# Patient Record
Sex: FEMALE | Race: OTHER | Employment: UNEMPLOYED | URBAN - METROPOLITAN AREA
[De-identification: names, ages, dates, MRNs, and addresses within clinical notes are randomized per-mention and may not be internally consistent; named-entity substitution may affect disease eponyms.]

---

## 2019-03-20 ENCOUNTER — HOSPITAL ENCOUNTER (EMERGENCY)
Age: 44
Discharge: HOME | End: 2019-03-20
Attending: EMERGENCY MEDICINE
Payer: COMMERCIAL

## 2019-03-20 ENCOUNTER — APPOINTMENT (OUTPATIENT)
Dept: GENERAL RADIOLOGY | Age: 44
End: 2019-03-20
Payer: COMMERCIAL

## 2019-03-20 VITALS
HEIGHT: 64 IN | SYSTOLIC BLOOD PRESSURE: 144 MMHG | RESPIRATION RATE: 14 BRPM | TEMPERATURE: 98.6 F | DIASTOLIC BLOOD PRESSURE: 93 MMHG | WEIGHT: 200 LBS | BODY MASS INDEX: 34.15 KG/M2 | OXYGEN SATURATION: 99 % | HEART RATE: 67 BPM

## 2019-03-20 DIAGNOSIS — I10 HYPERTENSION, UNSPECIFIED TYPE: ICD-10-CM

## 2019-03-20 DIAGNOSIS — J06.9 VIRAL UPPER RESPIRATORY TRACT INFECTION: Primary | ICD-10-CM

## 2019-03-20 LAB
ABSOLUTE EOS #: 0.2 K/UL (ref 0–0.4)
ABSOLUTE IMMATURE GRANULOCYTE: NORMAL K/UL (ref 0–0.3)
ABSOLUTE LYMPH #: 2.1 K/UL (ref 1–4.8)
ABSOLUTE MONO #: 0.5 K/UL (ref 0.1–1.3)
ANION GAP SERPL CALCULATED.3IONS-SCNC: 13 MMOL/L (ref 9–17)
BASOPHILS # BLD: 1 % (ref 0–2)
BASOPHILS ABSOLUTE: 0 K/UL (ref 0–0.2)
BUN BLDV-MCNC: 19 MG/DL (ref 6–20)
BUN/CREAT BLD: NORMAL (ref 9–20)
CALCIUM SERPL-MCNC: 9 MG/DL (ref 8.6–10.4)
CHLORIDE BLD-SCNC: 105 MMOL/L (ref 98–107)
CO2: 22 MMOL/L (ref 20–31)
CREAT SERPL-MCNC: 0.6 MG/DL (ref 0.5–0.9)
DIFFERENTIAL TYPE: NORMAL
EOSINOPHILS RELATIVE PERCENT: 3 % (ref 0–4)
GFR AFRICAN AMERICAN: >60 ML/MIN
GFR NON-AFRICAN AMERICAN: >60 ML/MIN
GFR SERPL CREATININE-BSD FRML MDRD: NORMAL ML/MIN/{1.73_M2}
GFR SERPL CREATININE-BSD FRML MDRD: NORMAL ML/MIN/{1.73_M2}
GLUCOSE BLD-MCNC: 91 MG/DL (ref 70–99)
HCT VFR BLD CALC: 41.4 % (ref 36–46)
HEMOGLOBIN: 14 G/DL (ref 12–16)
IMMATURE GRANULOCYTES: NORMAL %
LYMPHOCYTES # BLD: 30 % (ref 24–44)
MCH RBC QN AUTO: 29 PG (ref 26–34)
MCHC RBC AUTO-ENTMCNC: 33.8 G/DL (ref 31–37)
MCV RBC AUTO: 85.9 FL (ref 80–100)
MONOCYTES # BLD: 7 % (ref 1–7)
NRBC AUTOMATED: NORMAL PER 100 WBC
PDW BLD-RTO: 13.4 % (ref 11.5–14.9)
PLATELET # BLD: 317 K/UL (ref 150–450)
PLATELET ESTIMATE: NORMAL
PMV BLD AUTO: 7.7 FL (ref 6–12)
POTASSIUM SERPL-SCNC: 3.9 MMOL/L (ref 3.7–5.3)
RBC # BLD: 4.82 M/UL (ref 4–5.2)
RBC # BLD: NORMAL 10*6/UL
SEG NEUTROPHILS: 59 % (ref 36–66)
SEGMENTED NEUTROPHILS ABSOLUTE COUNT: 4 K/UL (ref 1.3–9.1)
SODIUM BLD-SCNC: 140 MMOL/L (ref 135–144)
TROPONIN INTERP: NORMAL
TROPONIN T: NORMAL NG/ML
TROPONIN, HIGH SENSITIVITY: <6 NG/L (ref 0–14)
WBC # BLD: 6.9 K/UL (ref 3.5–11)
WBC # BLD: NORMAL 10*3/UL

## 2019-03-20 PROCEDURE — 93005 ELECTROCARDIOGRAM TRACING: CPT

## 2019-03-20 PROCEDURE — 36415 COLL VENOUS BLD VENIPUNCTURE: CPT

## 2019-03-20 PROCEDURE — 84484 ASSAY OF TROPONIN QUANT: CPT

## 2019-03-20 PROCEDURE — 85025 COMPLETE CBC W/AUTO DIFF WBC: CPT

## 2019-03-20 PROCEDURE — 71046 X-RAY EXAM CHEST 2 VIEWS: CPT

## 2019-03-20 PROCEDURE — 80048 BASIC METABOLIC PNL TOTAL CA: CPT

## 2019-03-20 PROCEDURE — 6370000000 HC RX 637 (ALT 250 FOR IP): Performed by: STUDENT IN AN ORGANIZED HEALTH CARE EDUCATION/TRAINING PROGRAM

## 2019-03-20 PROCEDURE — 99285 EMERGENCY DEPT VISIT HI MDM: CPT

## 2019-03-20 RX ORDER — ALBUTEROL SULFATE 90 UG/1
1-2 AEROSOL, METERED RESPIRATORY (INHALATION) EVERY 4 HOURS PRN
Qty: 1 INHALER | Refills: 0 | Status: SHIPPED | OUTPATIENT
Start: 2019-03-20 | End: 2019-07-02

## 2019-03-20 RX ORDER — LOSARTAN POTASSIUM AND HYDROCHLOROTHIAZIDE 12.5; 1 MG/1; MG/1
1 TABLET ORAL DAILY
Qty: 30 TABLET | Refills: 0 | Status: SHIPPED | OUTPATIENT
Start: 2019-03-20

## 2019-03-20 RX ORDER — HYDROCHLOROTHIAZIDE 25 MG/1
25 TABLET ORAL DAILY
Status: DISCONTINUED | OUTPATIENT
Start: 2019-03-20 | End: 2019-03-20 | Stop reason: HOSPADM

## 2019-03-20 RX ADMIN — HYDROCHLOROTHIAZIDE 25 MG: 25 TABLET ORAL at 16:17

## 2019-03-20 ASSESSMENT — HEART SCORE: ECG: 0

## 2019-03-28 ENCOUNTER — OFFICE VISIT (OUTPATIENT)
Dept: INTERNAL MEDICINE CLINIC | Age: 44
End: 2019-03-28
Payer: COMMERCIAL

## 2019-03-28 VITALS
BODY MASS INDEX: 35.17 KG/M2 | SYSTOLIC BLOOD PRESSURE: 132 MMHG | HEART RATE: 97 BPM | WEIGHT: 206 LBS | DIASTOLIC BLOOD PRESSURE: 82 MMHG | HEIGHT: 64 IN | OXYGEN SATURATION: 98 %

## 2019-03-28 DIAGNOSIS — R07.9 CHEST PAIN, UNSPECIFIED TYPE: ICD-10-CM

## 2019-03-28 DIAGNOSIS — M19.041 OSTEOARTHRITIS OF BOTH HANDS, UNSPECIFIED OSTEOARTHRITIS TYPE: ICD-10-CM

## 2019-03-28 DIAGNOSIS — I10 ESSENTIAL HYPERTENSION: ICD-10-CM

## 2019-03-28 DIAGNOSIS — Z12.39 BREAST CANCER SCREENING: ICD-10-CM

## 2019-03-28 DIAGNOSIS — I10 HYPERTENSION, UNSPECIFIED TYPE: Primary | ICD-10-CM

## 2019-03-28 DIAGNOSIS — M19.042 OSTEOARTHRITIS OF BOTH HANDS, UNSPECIFIED OSTEOARTHRITIS TYPE: ICD-10-CM

## 2019-03-28 DIAGNOSIS — G43.809 OTHER MIGRAINE WITHOUT STATUS MIGRAINOSUS, NOT INTRACTABLE: ICD-10-CM

## 2019-03-28 PROCEDURE — G8484 FLU IMMUNIZE NO ADMIN: HCPCS | Performed by: INTERNAL MEDICINE

## 2019-03-28 PROCEDURE — G8427 DOCREV CUR MEDS BY ELIG CLIN: HCPCS | Performed by: INTERNAL MEDICINE

## 2019-03-28 PROCEDURE — G8417 CALC BMI ABV UP PARAM F/U: HCPCS | Performed by: INTERNAL MEDICINE

## 2019-03-28 PROCEDURE — 99204 OFFICE O/P NEW MOD 45 MIN: CPT | Performed by: INTERNAL MEDICINE

## 2019-03-28 PROCEDURE — 1036F TOBACCO NON-USER: CPT | Performed by: INTERNAL MEDICINE

## 2019-03-28 RX ORDER — SUMATRIPTAN 50 MG/1
50 TABLET, FILM COATED ORAL
Qty: 9 TABLET | Refills: 3 | Status: SHIPPED | OUTPATIENT
Start: 2019-03-28 | End: 2019-03-28

## 2019-03-28 RX ORDER — AMITRIPTYLINE HYDROCHLORIDE 25 MG/1
25 TABLET, FILM COATED ORAL NIGHTLY
Qty: 30 TABLET | Refills: 0 | Status: SHIPPED | OUTPATIENT
Start: 2019-03-28 | End: 2019-04-23 | Stop reason: SDUPTHER

## 2019-03-28 RX ORDER — PANTOPRAZOLE SODIUM 40 MG/1
40 TABLET, DELAYED RELEASE ORAL
Qty: 90 TABLET | Refills: 1 | Status: SHIPPED | OUTPATIENT
Start: 2019-03-28

## 2019-03-28 ASSESSMENT — ENCOUNTER SYMPTOMS
CHEST TIGHTNESS: 1
COLOR CHANGE: 0
BLOOD IN STOOL: 0
CHOKING: 0
CONSTIPATION: 0
EYE REDNESS: 0
EYE PAIN: 0
BACK PAIN: 0
DIARRHEA: 0
EYE DISCHARGE: 0
ABDOMINAL DISTENTION: 0
ABDOMINAL PAIN: 0
EYE ITCHING: 0
APNEA: 0
COUGH: 0
SHORTNESS OF BREATH: 1

## 2019-03-28 ASSESSMENT — PATIENT HEALTH QUESTIONNAIRE - PHQ9
SUM OF ALL RESPONSES TO PHQ QUESTIONS 1-9: 0
SUM OF ALL RESPONSES TO PHQ QUESTIONS 1-9: 0
1. LITTLE INTEREST OR PLEASURE IN DOING THINGS: 0
2. FEELING DOWN, DEPRESSED OR HOPELESS: 0
SUM OF ALL RESPONSES TO PHQ9 QUESTIONS 1 & 2: 0

## 2019-04-23 RX ORDER — AMITRIPTYLINE HYDROCHLORIDE 25 MG/1
TABLET, FILM COATED ORAL
Qty: 30 TABLET | Refills: 0 | Status: SHIPPED | OUTPATIENT
Start: 2019-04-23

## 2019-05-15 LAB
EKG ATRIAL RATE: 80 BPM
EKG P AXIS: 59 DEGREES
EKG P-R INTERVAL: 154 MS
EKG Q-T INTERVAL: 390 MS
EKG QRS DURATION: 80 MS
EKG QTC CALCULATION (BAZETT): 449 MS
EKG R AXIS: 64 DEGREES
EKG T AXIS: 17 DEGREES
EKG VENTRICULAR RATE: 80 BPM

## 2019-07-02 ENCOUNTER — HOSPITAL ENCOUNTER (EMERGENCY)
Age: 44
Discharge: HOME | End: 2019-07-02
Attending: EMERGENCY MEDICINE
Payer: COMMERCIAL

## 2019-07-02 ENCOUNTER — APPOINTMENT (OUTPATIENT)
Dept: GENERAL RADIOLOGY | Age: 44
End: 2019-07-02
Payer: COMMERCIAL

## 2019-07-02 VITALS
TEMPERATURE: 98.1 F | RESPIRATION RATE: 16 BRPM | WEIGHT: 218 LBS | DIASTOLIC BLOOD PRESSURE: 93 MMHG | HEIGHT: 64 IN | BODY MASS INDEX: 37.22 KG/M2 | SYSTOLIC BLOOD PRESSURE: 153 MMHG | OXYGEN SATURATION: 99 % | HEART RATE: 82 BPM

## 2019-07-02 DIAGNOSIS — R07.9 CHEST PAIN, UNSPECIFIED TYPE: Primary | ICD-10-CM

## 2019-07-02 LAB
ABSOLUTE EOS #: 0.2 K/UL (ref 0–0.4)
ABSOLUTE IMMATURE GRANULOCYTE: NORMAL K/UL (ref 0–0.3)
ABSOLUTE LYMPH #: 2.5 K/UL (ref 1–4.8)
ABSOLUTE MONO #: 0.5 K/UL (ref 0.1–1.3)
ALBUMIN SERPL-MCNC: 4.1 G/DL (ref 3.5–5.2)
ALBUMIN/GLOBULIN RATIO: NORMAL (ref 1–2.5)
ALP BLD-CCNC: 68 U/L (ref 35–104)
ALT SERPL-CCNC: 16 U/L (ref 5–33)
ANION GAP SERPL CALCULATED.3IONS-SCNC: 14 MMOL/L (ref 9–17)
AST SERPL-CCNC: 15 U/L
BASOPHILS # BLD: 1 % (ref 0–2)
BASOPHILS ABSOLUTE: 0.1 K/UL (ref 0–0.2)
BILIRUB SERPL-MCNC: 0.34 MG/DL (ref 0.3–1.2)
BUN BLDV-MCNC: 12 MG/DL (ref 6–20)
BUN/CREAT BLD: NORMAL (ref 9–20)
CALCIUM SERPL-MCNC: 8.8 MG/DL (ref 8.6–10.4)
CHLORIDE BLD-SCNC: 104 MMOL/L (ref 98–107)
CO2: 21 MMOL/L (ref 20–31)
CREAT SERPL-MCNC: 0.6 MG/DL (ref 0.5–0.9)
DIFFERENTIAL TYPE: NORMAL
EOSINOPHILS RELATIVE PERCENT: 2 % (ref 0–4)
GFR AFRICAN AMERICAN: >60 ML/MIN
GFR NON-AFRICAN AMERICAN: >60 ML/MIN
GFR SERPL CREATININE-BSD FRML MDRD: NORMAL ML/MIN/{1.73_M2}
GFR SERPL CREATININE-BSD FRML MDRD: NORMAL ML/MIN/{1.73_M2}
GLUCOSE BLD-MCNC: 85 MG/DL (ref 70–99)
HCG QUALITATIVE: NEGATIVE
HCT VFR BLD CALC: 39.5 % (ref 36–46)
HEMOGLOBIN: 13.2 G/DL (ref 12–16)
IMMATURE GRANULOCYTES: NORMAL %
LYMPHOCYTES # BLD: 31 % (ref 24–44)
MCH RBC QN AUTO: 28.5 PG (ref 26–34)
MCHC RBC AUTO-ENTMCNC: 33.5 G/DL (ref 31–37)
MCV RBC AUTO: 85 FL (ref 80–100)
MONOCYTES # BLD: 7 % (ref 1–7)
NRBC AUTOMATED: NORMAL PER 100 WBC
PDW BLD-RTO: 13.9 % (ref 11.5–14.9)
PLATELET # BLD: 330 K/UL (ref 150–450)
PLATELET ESTIMATE: NORMAL
PMV BLD AUTO: 7.3 FL (ref 6–12)
POTASSIUM SERPL-SCNC: 3.9 MMOL/L (ref 3.7–5.3)
RBC # BLD: 4.65 M/UL (ref 4–5.2)
RBC # BLD: NORMAL 10*6/UL
SEG NEUTROPHILS: 59 % (ref 36–66)
SEGMENTED NEUTROPHILS ABSOLUTE COUNT: 4.8 K/UL (ref 1.3–9.1)
SODIUM BLD-SCNC: 139 MMOL/L (ref 135–144)
TOTAL PROTEIN: 7.4 G/DL (ref 6.4–8.3)
TROPONIN INTERP: NORMAL
TROPONIN INTERP: NORMAL
TROPONIN T: NORMAL NG/ML
TROPONIN T: NORMAL NG/ML
TROPONIN, HIGH SENSITIVITY: <6 NG/L (ref 0–14)
TROPONIN, HIGH SENSITIVITY: <6 NG/L (ref 0–14)
WBC # BLD: 8.1 K/UL (ref 3.5–11)
WBC # BLD: NORMAL 10*3/UL

## 2019-07-02 PROCEDURE — 6370000000 HC RX 637 (ALT 250 FOR IP): Performed by: EMERGENCY MEDICINE

## 2019-07-02 PROCEDURE — 84703 CHORIONIC GONADOTROPIN ASSAY: CPT

## 2019-07-02 PROCEDURE — 94761 N-INVAS EAR/PLS OXIMETRY MLT: CPT

## 2019-07-02 PROCEDURE — 94640 AIRWAY INHALATION TREATMENT: CPT

## 2019-07-02 PROCEDURE — 99285 EMERGENCY DEPT VISIT HI MDM: CPT

## 2019-07-02 PROCEDURE — 36415 COLL VENOUS BLD VENIPUNCTURE: CPT

## 2019-07-02 PROCEDURE — 85025 COMPLETE CBC W/AUTO DIFF WBC: CPT

## 2019-07-02 PROCEDURE — 80053 COMPREHEN METABOLIC PANEL: CPT

## 2019-07-02 PROCEDURE — 6360000002 HC RX W HCPCS: Performed by: EMERGENCY MEDICINE

## 2019-07-02 PROCEDURE — 71045 X-RAY EXAM CHEST 1 VIEW: CPT

## 2019-07-02 PROCEDURE — 93005 ELECTROCARDIOGRAM TRACING: CPT | Performed by: EMERGENCY MEDICINE

## 2019-07-02 PROCEDURE — 84484 ASSAY OF TROPONIN QUANT: CPT

## 2019-07-02 RX ORDER — ALBUTEROL SULFATE 2.5 MG/3ML
2.5 SOLUTION RESPIRATORY (INHALATION) ONCE
Status: COMPLETED | OUTPATIENT
Start: 2019-07-02 | End: 2019-07-02

## 2019-07-02 RX ORDER — LIDOCAINE HYDROCHLORIDE 20 MG/ML
15 SOLUTION OROPHARYNGEAL
Status: DISCONTINUED | OUTPATIENT
Start: 2019-07-02 | End: 2019-07-02 | Stop reason: HOSPADM

## 2019-07-02 RX ORDER — ACETAMINOPHEN 325 MG/1
650 TABLET ORAL ONCE
Status: COMPLETED | OUTPATIENT
Start: 2019-07-02 | End: 2019-07-02

## 2019-07-02 RX ORDER — MAGNESIUM HYDROXIDE/ALUMINUM HYDROXICE/SIMETHICONE 120; 1200; 1200 MG/30ML; MG/30ML; MG/30ML
30 SUSPENSION ORAL ONCE
Status: COMPLETED | OUTPATIENT
Start: 2019-07-02 | End: 2019-07-02

## 2019-07-02 RX ORDER — ALBUTEROL SULFATE 90 UG/1
2 AEROSOL, METERED RESPIRATORY (INHALATION) 4 TIMES DAILY PRN
Qty: 1 INHALER | Refills: 0 | Status: SHIPPED | OUTPATIENT
Start: 2019-07-02

## 2019-07-02 RX ADMIN — ACETAMINOPHEN 650 MG: 325 TABLET, FILM COATED ORAL at 18:25

## 2019-07-02 RX ADMIN — LIDOCAINE HYDROCHLORIDE 15 ML: 20 SOLUTION ORAL; TOPICAL at 17:30

## 2019-07-02 RX ADMIN — ALBUTEROL SULFATE 2.5 MG: 2.5 SOLUTION RESPIRATORY (INHALATION) at 16:32

## 2019-07-02 RX ADMIN — ALUMINUM HYDROXIDE, MAGNESIUM HYDROXIDE, AND SIMETHICONE 30 ML: 200; 200; 20 SUSPENSION ORAL at 17:30

## 2019-07-02 ASSESSMENT — ENCOUNTER SYMPTOMS
ABDOMINAL PAIN: 0
GASTROINTESTINAL NEGATIVE: 1
RESPIRATORY NEGATIVE: 1
SHORTNESS OF BREATH: 0
EYES NEGATIVE: 1
COUGH: 0
BACK PAIN: 0

## 2019-07-02 ASSESSMENT — PAIN DESCRIPTION - FREQUENCY: FREQUENCY: INTERMITTENT

## 2019-07-02 ASSESSMENT — PAIN DESCRIPTION - DESCRIPTORS: DESCRIPTORS: PRESSURE;TIGHTNESS

## 2019-07-02 ASSESSMENT — PAIN DESCRIPTION - ONSET: ONSET: GRADUAL

## 2019-07-02 ASSESSMENT — PAIN DESCRIPTION - LOCATION: LOCATION: CHEST

## 2019-07-02 ASSESSMENT — PAIN DESCRIPTION - PAIN TYPE: TYPE: ACUTE PAIN

## 2019-07-02 ASSESSMENT — PAIN SCALES - GENERAL: PAINLEVEL_OUTOF10: 5

## 2019-07-02 NOTE — ED PROVIDER NOTES
EMERGENCY DEPARTMENT ENCOUNTER    Pt Name: Peter Stock  MRN: [id number]  [place] 1975  Date of evaluation: 7/2/19  CHIEF COMPLAINT       Chief Complaint   Patient presents with    Chest Pain    Shortness of Breath     HISTORY OF PRESENT ILLNESS   The pt presents for evaluation of chest pain and shortness of breath. It started at 0700 this am.  On/off. Described as a pressure. No fever or cough. Pt denies travel, trauma, surgery, leg pain/swelling, estrogens, history of dvt/pe or other concerning symptoms. Pt denies any cardiac history. She does have a history of htn. Family history of cad. Nonsmoker. The history is provided by the patient. Chest Pain   Pain location:  Substernal area  Pain quality: pressure    Pain radiates to:  Does not radiate  Pain severity:  Moderate  Onset quality:  Gradual  Duration:  9 hours  Timing:  Constant  Progression:  Unchanged  Chronicity:  New  Relieved by:  Nothing  Worsened by:  Nothing  Ineffective treatments:  None tried  Associated symptoms: no abdominal pain, no back pain, no cough, no fever, no headache and no shortness of breath        REVIEW OF SYSTEMS     Review of Systems   Constitutional: Negative. Negative for chills and fever. HENT: Negative. Negative for congestion. Eyes: Negative. Respiratory: Negative. Negative for cough and shortness of breath. Cardiovascular: Positive for chest pain. Gastrointestinal: Negative. Negative for abdominal pain. Genitourinary: Negative. Musculoskeletal: Negative. Negative for back pain. Skin: Negative. Negative for rash. Neurological: Negative. Negative for headaches. All other systems reviewed and are negative. PASTMEDICAL HISTORY     Past Medical History:   Diagnosis Date    Dizzy spells     Hypertension     Low blood potassium     Mass     rt.  thigh    Migraine     Osteoarthritis     hip    Vitamin D deficiency      SURGICAL HISTORY       Past Surgical History:

## 2019-07-03 LAB
EKG ATRIAL RATE: 77 BPM
EKG P AXIS: 61 DEGREES
EKG P-R INTERVAL: 152 MS
EKG Q-T INTERVAL: 388 MS
EKG QRS DURATION: 90 MS
EKG QTC CALCULATION (BAZETT): 439 MS
EKG R AXIS: 68 DEGREES
EKG T AXIS: 31 DEGREES
EKG VENTRICULAR RATE: 77 BPM

## 2019-07-03 PROCEDURE — 93010 ELECTROCARDIOGRAM REPORT: CPT | Performed by: INTERNAL MEDICINE

## 2019-07-05 ENCOUNTER — TELEPHONE (OUTPATIENT)
Dept: INTERNAL MEDICINE CLINIC | Age: 44
End: 2019-07-05

## 2020-02-19 ENCOUNTER — HOSPITAL ENCOUNTER (OUTPATIENT)
Age: 45
Setting detail: SPECIMEN
Discharge: HOME | End: 2020-02-19
Payer: COMMERCIAL

## 2020-02-19 LAB
ALBUMIN SERPL-MCNC: 4.1 G/DL (ref 3.5–5.2)
ALBUMIN/GLOBULIN RATIO: 1.1 (ref 1–2.5)
ALP BLD-CCNC: 86 U/L (ref 35–104)
ALT SERPL-CCNC: 24 U/L (ref 5–33)
ANION GAP SERPL CALCULATED.3IONS-SCNC: 16 MMOL/L (ref 9–17)
AST SERPL-CCNC: 19 U/L
BILIRUB SERPL-MCNC: 0.33 MG/DL (ref 0.3–1.2)
BUN BLDV-MCNC: 14 MG/DL (ref 6–20)
BUN/CREAT BLD: ABNORMAL (ref 9–20)
CALCIUM SERPL-MCNC: 9.3 MG/DL (ref 8.6–10.4)
CHLORIDE BLD-SCNC: 103 MMOL/L (ref 98–107)
CHOLESTEROL/HDL RATIO: 3.6
CHOLESTEROL: 181 MG/DL
CO2: 19 MMOL/L (ref 20–31)
CREAT SERPL-MCNC: 0.57 MG/DL (ref 0.5–0.9)
GFR AFRICAN AMERICAN: >60 ML/MIN
GFR NON-AFRICAN AMERICAN: >60 ML/MIN
GFR SERPL CREATININE-BSD FRML MDRD: ABNORMAL ML/MIN/{1.73_M2}
GFR SERPL CREATININE-BSD FRML MDRD: ABNORMAL ML/MIN/{1.73_M2}
GLUCOSE BLD-MCNC: 97 MG/DL (ref 70–99)
HCT VFR BLD CALC: 42.6 % (ref 36.3–47.1)
HDLC SERPL-MCNC: 50 MG/DL
HEMOGLOBIN: 14 G/DL (ref 11.9–15.1)
LDL CHOLESTEROL: 115 MG/DL (ref 0–130)
MCH RBC QN AUTO: 27.6 PG (ref 25.2–33.5)
MCHC RBC AUTO-ENTMCNC: 32.9 G/DL (ref 28.4–34.8)
MCV RBC AUTO: 84 FL (ref 82.6–102.9)
NRBC AUTOMATED: 0 PER 100 WBC
PDW BLD-RTO: 13.8 % (ref 11.8–14.4)
PLATELET # BLD: 330 K/UL (ref 138–453)
PMV BLD AUTO: 9.9 FL (ref 8.1–13.5)
POTASSIUM SERPL-SCNC: 4.3 MMOL/L (ref 3.7–5.3)
RBC # BLD: 5.07 M/UL (ref 3.95–5.11)
SODIUM BLD-SCNC: 138 MMOL/L (ref 135–144)
THYROXINE, FREE: 1.25 NG/DL (ref 0.93–1.7)
TOTAL PROTEIN: 7.8 G/DL (ref 6.4–8.3)
TRIGL SERPL-MCNC: 81 MG/DL
TSH SERPL DL<=0.05 MIU/L-ACNC: 0.5 MIU/L (ref 0.3–5)
VITAMIN D 25-HYDROXY: 12.8 NG/ML (ref 30–100)
VLDLC SERPL CALC-MCNC: NORMAL MG/DL (ref 1–30)
WBC # BLD: 7.5 K/UL (ref 3.5–11.3)

## 2020-02-20 LAB
INSULIN COMMENT: NORMAL
INSULIN REFERENCE RANGE:: NORMAL
INSULIN: 17.5 MU/L

## 2020-03-02 ENCOUNTER — HOSPITAL ENCOUNTER (OUTPATIENT)
Age: 45
Discharge: HOME | End: 2020-03-02
Payer: COMMERCIAL

## 2020-03-02 PROCEDURE — 93005 ELECTROCARDIOGRAM TRACING: CPT | Performed by: PHYSICIAN ASSISTANT

## 2020-03-03 LAB
EKG ATRIAL RATE: 72 BPM
EKG P AXIS: 67 DEGREES
EKG P-R INTERVAL: 160 MS
EKG Q-T INTERVAL: 416 MS
EKG QRS DURATION: 78 MS
EKG QTC CALCULATION (BAZETT): 455 MS
EKG R AXIS: 70 DEGREES
EKG T AXIS: 25 DEGREES
EKG VENTRICULAR RATE: 72 BPM

## 2020-03-03 PROCEDURE — 93010 ELECTROCARDIOGRAM REPORT: CPT | Performed by: INTERNAL MEDICINE

## 2020-03-18 ENCOUNTER — HOSPITAL ENCOUNTER (OUTPATIENT)
Dept: GENERAL RADIOLOGY | Age: 45
Discharge: HOME | End: 2020-03-20
Payer: COMMERCIAL

## 2020-03-18 ENCOUNTER — HOSPITAL ENCOUNTER (OUTPATIENT)
Age: 45
Discharge: HOME | End: 2020-03-18
Payer: COMMERCIAL

## 2020-03-18 ENCOUNTER — HOSPITAL ENCOUNTER (OUTPATIENT)
Age: 45
Discharge: HOME | End: 2020-03-20
Payer: COMMERCIAL

## 2020-03-18 PROCEDURE — 73502 X-RAY EXAM HIP UNI 2-3 VIEWS: CPT

## 2020-03-18 PROCEDURE — 93005 ELECTROCARDIOGRAM TRACING: CPT | Performed by: PHYSICIAN ASSISTANT

## 2020-03-19 LAB
EKG ATRIAL RATE: 62 BPM
EKG P AXIS: 71 DEGREES
EKG P-R INTERVAL: 158 MS
EKG Q-T INTERVAL: 434 MS
EKG QRS DURATION: 88 MS
EKG QTC CALCULATION (BAZETT): 440 MS
EKG R AXIS: 90 DEGREES
EKG T AXIS: 52 DEGREES
EKG VENTRICULAR RATE: 62 BPM

## 2020-07-28 ENCOUNTER — HOSPITAL ENCOUNTER (OUTPATIENT)
Dept: GENERAL RADIOLOGY | Age: 45
Discharge: HOME | End: 2020-07-30
Payer: COMMERCIAL

## 2020-07-28 ENCOUNTER — HOSPITAL ENCOUNTER (OUTPATIENT)
Age: 45
Discharge: HOME | End: 2020-07-28
Payer: COMMERCIAL

## 2020-07-28 ENCOUNTER — HOSPITAL ENCOUNTER (OUTPATIENT)
Age: 45
Discharge: HOME | End: 2020-07-30
Payer: COMMERCIAL

## 2020-07-28 PROCEDURE — 93005 ELECTROCARDIOGRAM TRACING: CPT | Performed by: FAMILY MEDICINE

## 2020-07-28 PROCEDURE — 71046 X-RAY EXAM CHEST 2 VIEWS: CPT

## 2020-07-29 LAB
EKG ATRIAL RATE: 70 BPM
EKG P AXIS: 65 DEGREES
EKG P-R INTERVAL: 150 MS
EKG Q-T INTERVAL: 396 MS
EKG QRS DURATION: 76 MS
EKG QTC CALCULATION (BAZETT): 427 MS
EKG R AXIS: 65 DEGREES
EKG T AXIS: 30 DEGREES
EKG VENTRICULAR RATE: 70 BPM

## 2020-07-29 PROCEDURE — 93010 ELECTROCARDIOGRAM REPORT: CPT | Performed by: INTERNAL MEDICINE

## 2021-12-22 ENCOUNTER — HOSPITAL ENCOUNTER (EMERGENCY)
Age: 46
Discharge: HOME | End: 2021-12-22
Attending: STUDENT IN AN ORGANIZED HEALTH CARE EDUCATION/TRAINING PROGRAM
Payer: COMMERCIAL

## 2021-12-22 ENCOUNTER — APPOINTMENT (OUTPATIENT)
Dept: GENERAL RADIOLOGY | Age: 46
End: 2021-12-22
Payer: COMMERCIAL

## 2021-12-22 VITALS
HEIGHT: 64 IN | BODY MASS INDEX: 39.27 KG/M2 | DIASTOLIC BLOOD PRESSURE: 81 MMHG | TEMPERATURE: 97.8 F | HEART RATE: 96 BPM | SYSTOLIC BLOOD PRESSURE: 138 MMHG | RESPIRATION RATE: 18 BRPM | WEIGHT: 230 LBS | OXYGEN SATURATION: 99 %

## 2021-12-22 DIAGNOSIS — M23.41 LOOSE BODY OF RIGHT KNEE: ICD-10-CM

## 2021-12-22 DIAGNOSIS — M25.461 EFFUSION OF RIGHT KNEE: ICD-10-CM

## 2021-12-22 DIAGNOSIS — M25.561 CHRONIC PAIN OF RIGHT KNEE: Primary | ICD-10-CM

## 2021-12-22 DIAGNOSIS — G89.29 CHRONIC PAIN OF RIGHT KNEE: Primary | ICD-10-CM

## 2021-12-22 LAB — D-DIMER QUANTITATIVE: 0.39 MG/L FEU (ref 0–0.59)

## 2021-12-22 PROCEDURE — 85379 FIBRIN DEGRADATION QUANT: CPT

## 2021-12-22 PROCEDURE — 36415 COLL VENOUS BLD VENIPUNCTURE: CPT

## 2021-12-22 PROCEDURE — 73562 X-RAY EXAM OF KNEE 3: CPT

## 2021-12-22 PROCEDURE — 99283 EMERGENCY DEPT VISIT LOW MDM: CPT

## 2021-12-22 ASSESSMENT — PAIN SCALES - GENERAL: PAINLEVEL_OUTOF10: 5

## 2021-12-22 ASSESSMENT — PAIN DESCRIPTION - LOCATION: LOCATION: LEG

## 2021-12-22 ASSESSMENT — PAIN DESCRIPTION - DESCRIPTORS: DESCRIPTORS: DISCOMFORT

## 2021-12-22 ASSESSMENT — PAIN DESCRIPTION - PAIN TYPE: TYPE: ACUTE PAIN

## 2021-12-22 ASSESSMENT — PAIN DESCRIPTION - ORIENTATION: ORIENTATION: RIGHT

## 2021-12-22 ASSESSMENT — PAIN DESCRIPTION - FREQUENCY: FREQUENCY: INTERMITTENT

## 2021-12-22 NOTE — ED PROVIDER NOTES
eMERGENCY dEPARTMENT eNCOUnter   Independent Attestation     Pt Name: Emir Novak  MRN: [id number]  [place] 1975  Date of evaluation: 12/22/21     Emir Novak is a 55 y.o. female with CC: Leg Pain      Based on the medical record the care appears appropriate. I was personally available for consultation in the Emergency Department. The care is provided during an unprecedented national emergency due to the novel coronavirus, COVID 19.     Marizol Garzon MD  Attending Emergency Physician                    Marizol Garzon MD  12/22/21 [id number]

## 2021-12-22 NOTE — ED PROVIDER NOTES
[address]  eMERGENCY dEPARTMENT eNCOUnter      Pt Name: Nicolette Troncoso  MRN: [id number]  [place] 1975  Date of evaluation: 12/22/2021  Provider: Merary Donis PA-C    CHIEF COMPLAINT       Chief Complaint   Patient presents with    Leg Pain           HISTORY OF PRESENT ILLNESS  (Location/Symptom, Timing/Onset, Context/Setting, Quality, Duration, Modifying Factors, Severity.)   Nicolette Troncoso is a 55 y.o. female who presents to the emergency department evaluation of right leg pain. Patient states she has had pain in her leg since she was a child. Patient reports in the past few weeks the pain has worsened. denies injury. States majority of the pain is located in her knee. Patient states she will get some pain and cramping in her calf at night. Patient reports pain is worse at night. States she is having difficulty bending her knee. She denies any numbness, weakness. Eyes fevers, chest pain, shortness of breath. No history of DVT/PE, history of cancer, recent traveling/immobilization/surgeries, estrogen use. Pt has been taking OTC medications for pain. No other complaints. Nursing Notes were reviewed. REVIEW OF SYSTEMS    (2-9 systems for level 4, 10 or more for level 5)     Review of Systems   Leg pain   Knee pain  Spasms       Except as noted above the remainder of the review of systems was reviewed and negative. PAST MEDICAL HISTORY     Past Medical History:   Diagnosis Date    Dizzy spells     Hypertension     Low blood potassium     Mass     rt.  thigh    Migraine     Osteoarthritis     hip    Vitamin D deficiency      None otherwise stated in nurses notes    SURGICAL HISTORY       Past Surgical History:   Procedure Laterality Date    CYST REMOVAL Left     ring finger    OTHER SURGICAL HISTORY Right 04/15/2014    removal of lesions X4    WISDOM TOOTH EXTRACTION       None otherwise stated in nurses notes    [address]       Discharge Medication List as of 2021 12:25 PM      CONTINUE these medications which have NOT CHANGED    Details   albuterol sulfate  (90 Base) MCG/ACT inhaler Inhale 2 puffs into the lungs 4 times daily as needed for Wheezing, Disp-1 Inhaler, R-0Print      amitriptyline (ELAVIL) 25 MG tablet TAKE 1 TABLET BY MOUTH EVERY NIGHT, Disp-30 tablet, R-0Normal      SUMAtriptan (IMITREX) 50 MG tablet Take 1 tablet by mouth once as needed for Migraine Do not takes More than 2 pills a day, Disp-9 tablet, R-3Normal      pantoprazole (PROTONIX) 40 MG tablet Take 1 tablet by mouth every morning (before breakfast), Disp-90 tablet, R-1Normal      losartan-hydrochlorothiazide (HYZAAR) 100-12.5 MG per tablet Take 1 tablet by mouth daily, Disp-30 tablet, R-0Print             ALLERGIES     Aspirin, Codeine, Motrin [ibuprofen], and Naprosyn [naproxen]    FAMILY HISTORY           Problem Relation Age of Onset    High Blood Pressure Mother     Arthritis Mother     Asthma Mother     Heart Disease Father     High Blood Pressure Brother      Family Status   Relation Name Status    Mother  Alive    Father      Brother  (Not Specified)      None otherwise stated in nurses notes    SOCIAL HISTORY      reports that she has never smoked. She has never used smokeless tobacco. She reports that she does not drink alcohol and does not use drugs. lives at home with others     PHYSICAL EXAM    (up to 7 for level 4, 8 or more for level 5)     ED Triage Vitals [21 1122]   BP Temp Temp Source Pulse Resp SpO2 Height Weight   138/81 97.8 °F (36.6 °C) Temporal 96 18 99 % 5' 4\" (1.626 m) 230 lb (104.3 kg)       Physical Exam   Nursing note and vitals reviewed. Constitutional: Oriented to person, place, and time and well-developed, well-nourished. Head: Normocephalic and atraumatic. Ear: External ears normal.   Nose: Nose normal and midline. Eyes: Conjunctivae and EOM are normal. Pupils are equal, round, and reactive to light.    Neck: Normal range of motion. Neck supple. Throat: Mask not removed due to Covid pandemic  Cardiovascular: Normal rate, regular rhythm, normal heart sounds and intact distal pulses. Pulmonary/Chest: Effort normal and breath sounds normal. No respiratory distress. No wheezes. No rales. No chest tenderness. Musculoskeletal: examination of right leg reveals no deformities, swelling, effusion. Generalized tenderness over the anterior aspect of the knee. Pain with flexion. There is mild calf tenderness. No erythema, edema. 2/2 DP and PT pulses. Brisk cap refill. Distal sensation intact. Ambulatory. Neurological: Alert and oriented to person, place, and time. GCS score is 15. Skin: Skin is warm and dry. No rash noted. No erythema. No pallor. Psychiatric: Mood, memory, affect and judgment normal.           DIAGNOSTIC RESULTS     EKG: All EKG's are interpreted by the Emergency Department Physician who either signs or Co-signs this chart in the absence of a cardiologist.        RADIOLOGY:   All plain film, CT, MRI, and formal ultrasound images (except ED bedside ultrasound) are read by the radiologist, see reports below, unless otherwise noted in MDM or here. XR KNEE RIGHT (3 VIEWS)   Final Result   1. Moderate joint effusion. 2. 6 mm loose body in the posterior joint recess. 3. Mild degenerative changes of the medial and patellofemoral compartments. No acute fracture or dislocation. No results found. LABS:  Labs Reviewed   D-DIMER, QUANTITATIVE       All other labs were within normal range or not returned as of this dictation.     EMERGENCY DEPARTMENT COURSE and DIFFERENTIAL DIAGNOSIS/MDM:   Vitals:    Vitals:    12/22/21 1122   BP: 138/81   Pulse: 96   Resp: 18   Temp: 97.8 °F (36.6 °C)   TempSrc: Temporal   SpO2: 99%   Weight: 230 lb (104.3 kg)   Height: 5' 4\" (1.626 m)         Patient instructed to return to the emergency room if symptoms worsen, return, or any other concern right away which is agreed by the patient    ED MEDS:  No orders of the defined types were placed in this encounter. CONSULTS:  None    PROCEDURES:  None      FINAL IMPRESSION      1. Chronic pain of right knee    2. Effusion of right knee    3. Loose body of right knee          DISPOSITION/PLAN   DISPOSITION Decision To Discharge    PATIENT REFERRED TO:  Birgit Sullivan  [facility] [address]  [facility] ED  [place] [address]  [facility], [address]  [address]  [address] [facility] [address] [phone]    Call         DISCHARGE MEDICATIONS:  Discharge Medication List as of 12/22/2021 12:25 PM            Summation      Patient Course:    Right knee and leg pain chornic. Pain with flexion of knee. Some calf pain. D-dimer is negative. Knee xray shows effusion with degenerative changes and loose body. No signs of gout or cellulitis on exam.     Will refer to dr. Renu Reina. Recommend tylenol, ice, elevation. Discussed results and plan with the pt. They expressed appropriate understanding. Pt given close follow up, supportive care instructions and strict return instructions at the bedside. The care is provided during an unprecedented national emergency due to the novel coronavirus, COVID-19. ED Medications administered this visit:  Medications - No data to display    New Prescriptions from this visit:    Discharge Medication List as of 12/22/2021 12:25 PM          Follow-up:  Birgit Sullivan  [facility] [address]  [facility] ED  [place] [address]  [facility], [facility]  [address]  [address] [facility] [address] [phone]    Call           Final Impression:   1. Chronic pain of right knee    2. Effusion of right knee    3.  Loose body of right knee               (Please note that portions of this note were completed with a voice recognition program )        YOHANA Wang PA-C  12/22/21 [id number]

## 2022-01-05 ENCOUNTER — OFFICE VISIT (OUTPATIENT)
Dept: ORTHOPEDIC SURGERY | Age: 47
End: 2022-01-05
Payer: COMMERCIAL

## 2022-01-05 DIAGNOSIS — M79.604 PAIN OF RIGHT LOWER EXTREMITY: Primary | ICD-10-CM

## 2022-01-05 DIAGNOSIS — M25.561 RIGHT KNEE PAIN, UNSPECIFIED CHRONICITY: ICD-10-CM

## 2022-01-05 PROCEDURE — 99203 OFFICE O/P NEW LOW 30 MIN: CPT | Performed by: ORTHOPAEDIC SURGERY

## 2022-01-05 NOTE — PROGRESS NOTES
Kathleen Phlegm M.D.            [address] [facility] [facility], [address] [facility]           Dept Phone: [phone]           Dept Fax:  [address]           [place]          Dept Phone: [phone]           Dept Fax:  [phone]      Chief Compliant:  Chief Complaint   Patient presents with    Pain     Rt leg        History of Present Illness: This is a 55 y.o. female who presents to the clinic today for evaluation / follow up of acute right knee pain. Patient has a 72-year-old female who states that she was in the emergency room on December 22 after acute right knee pain she does not recall a specific injury, per se is at precipitated this but she has been having ongoing increasing pain the last couple months. She describes swelling of her knee as well as catching stabbing sensation of her knee. She states that she has had the kneecap problems in the past.  Patient is not presently work. She also has complaints regarding her left hip for a later date. Review of Systems   Constitutional: Negative for fever, chills, sweats. Eyes: Negative for changes in vision, or pain. HENT: Negative for ear ache, epistaxis, or sore throat. Respiratory/Cardio: Negative for Chest pain, palpitations, SOB, or cough. Gastrointestinal: Negative for abdominal pain, N/V/D. Genitourinary: Negative for dysuria, frequency, urgency, or hematuria. Neurological: Negative for headache, numbness, or weakness. Integumentary: Negative for rash, itching, laceration, or abrasion. Musculoskeletal: Positive for Pain (Rt leg)       Physical Exam:  Constitutional: Patient is oriented to person, place, and time. Patient appears well-developed and well nourished.    HENT: Negative otherwise noted  Head: Normocephalic and Atraumatic  Nose: Normal  Eyes: Conjunctivae and EOM are normal  Neck: Normal range of motion Neck supple. Respiratory/Cardio: Effort normal. No respiratory distress. Musculoskeletal: Lamination the patient's right knee does not denote a mild to moderate effusion of right knee. She is unable to get her knee all the way straight and has pain when I force this. She has pain after 100 degrees of flexion and plantar Lachman's difficult to assess given the size of her knee. She does have medial joint line tenderness and a positive reproducible Thu's medially. Collaterals are good at 0 60 degrees in mid flexion modest patellofemoral pain with compression no IT band findings no calf tenderness negative Homans. Neurological: Patient is alert and oriented to person, place, and time. Normal strenght. No sensory deficit. Skin: Skin is warm and dry  Psychiatric: Behavior is normal. Thought content normal.  Nursing note and vitals reviewed. Labs and Imaging:     XR taken today: No x-rays taken today however patient did have x-rays taken at [facility] emergency room on December 22, 2021. He show AP oblique and lateral views of the patient's right knee. She is noted on the lateral view to have moderate at this early significant patellofemoral joint space narrowing as well as spur formation of both the superior and inferior poles. She has questionable loose body versus osteophyte posteriorly which appears to be chronic in nature. Patient has on AP view some very modest minimal joint space narrowing but these were supine views. No results found. No orders of the defined types were placed in this encounter. Assessment and Plan:  1. Pain of right lower extremity            This is a 55 y.o. female who presents to the clinic today for evaluation / follow up of suspicious for medial meniscus tear right knee.      Past History:    Current Outpatient Medications:     albuterol sulfate  (90 Base) MCG/ACT inhaler, Inhale 2 puffs into the lungs 4 times daily as needed for Wheezing, Disp: 1 Inhaler, Rfl: 0    amitriptyline (ELAVIL) 25 MG tablet, TAKE 1 TABLET BY MOUTH EVERY NIGHT, Disp: 30 tablet, Rfl: 0    SUMAtriptan (IMITREX) 50 MG tablet, Take 1 tablet by mouth once as needed for Migraine Do not takes More than 2 pills a day, Disp: 9 tablet, Rfl: 3    pantoprazole (PROTONIX) 40 MG tablet, Take 1 tablet by mouth every morning (before breakfast), Disp: 90 tablet, Rfl: 1    losartan-hydrochlorothiazide (HYZAAR) 100-12.5 MG per tablet, Take 1 tablet by mouth daily, Disp: 30 tablet, Rfl: 0  Allergies   Allergen Reactions    Aspirin Other (See Comments)     Mouth breaks out    Codeine Other (See Comments)     Patient has bad stomach pain    Motrin [Ibuprofen] Other (See Comments)     Mouth breaks out    Naprosyn [Naproxen] Other (See Comments)     Unsure of reaction     Social History     Socioeconomic History    Marital status:      Spouse name: Not on file    Number of children: Not on file    Years of education: Not on file    Highest education level: Not on file   Occupational History    Not on file   Tobacco Use    Smoking status: Never Smoker    Smokeless tobacco: Never Used   Substance and Sexual Activity    Alcohol use: No    Drug use: No    Sexual activity: Not on file   Other Topics Concern    Not on file   Social History Narrative    Not on file     Social Determinants of Health     Financial Resource Strain:     Difficulty of Paying Living Expenses: Not on file   Food Insecurity:     Worried About Running Out of Food in the Last Year: Not on file    Kita of Food in the Last Year: Not on file   Transportation Needs:     Lack of Transportation (Medical): Not on file    Lack of Transportation (Non-Medical):  Not on file   Physical Activity:     Days of Exercise per Week: Not on file    Minutes of Exercise per Session: Not on file   Stress:     Feeling of Stress : Not on file   Social Connections:  Frequency of Communication with Friends and Family: Not on file    Frequency of Social Gatherings with Friends and Family: Not on file    Attends Quaker Services: Not on file    Active Member of Clubs or Organizations: Not on file    Attends Club or Organization Meetings: Not on file    Marital Status: Not on file   Intimate Partner Violence:     Fear of Current or Ex-Partner: Not on file    Emotionally Abused: Not on file    Physically Abused: Not on file    Sexually Abused: Not on file   Housing Stability:     Unable to Pay for Housing in the Last Year: Not on file    Number of [place] in the Last Year: Not on file    Unstable Housing in the Last Year: Not on file     Past Medical History:   Diagnosis Date    Dizzy spells     Hypertension     Low blood potassium     Mass     rt. thigh    Migraine     Osteoarthritis     hip    Vitamin D deficiency      Past Surgical History:   Procedure Laterality Date    CYST REMOVAL Left     ring finger    OTHER SURGICAL HISTORY Right 04/15/2014    removal of lesions X4    WISDOM TOOTH EXTRACTION       Family History   Problem Relation Age of Onset    High Blood Pressure Mother     Arthritis Mother     Asthma Mother     Heart Disease Father     High Blood Pressure Brother    Plan  Patient to be scheduled for an MRI of her right knee. We did discuss that if this is indeed positive that she would benefit from arthroscopic examination of the knee. We discussed the details the procedure as well as the risk and benefits. We will await the results of the MRI      Provider Attestation:  Nadir Ng, personally performed the services described in this documentation. All medical record entries made by the scribe were at my direction and in my presence. I have reviewed the chart and discharge instructions and agree that the records reflect my personal performance and is accurate and complete.  Mirna Paz MD. 01/05/22      Please note that this chart was generated using voice recognition Dragon dictation software. Although every effort was made to ensure the accuracy of this automated transcription, some errors in transcription may have occurred.

## 2022-01-12 ENCOUNTER — HOSPITAL ENCOUNTER (OUTPATIENT)
Dept: MRI IMAGING | Age: 47
Discharge: HOME | End: 2022-01-14
Payer: COMMERCIAL

## 2022-01-12 DIAGNOSIS — M25.561 RIGHT KNEE PAIN, UNSPECIFIED CHRONICITY: ICD-10-CM

## 2022-01-12 PROCEDURE — 73721 MRI JNT OF LWR EXTRE W/O DYE: CPT

## 2022-01-14 ENCOUNTER — TELEPHONE (OUTPATIENT)
Dept: ORTHOPEDIC SURGERY | Age: 47
End: 2022-01-14

## 2022-01-14 NOTE — TELEPHONE ENCOUNTER
----- Message from Casandra Mercedes MD sent at 1/13/2022  7:21 AM EST -----  Medial meniscus tear, schedule scope

## 2022-01-17 NOTE — H&P
today: No x-rays taken today however patient did have x-rays taken at [facility] emergency room on December 22, 2021. He show AP oblique and lateral views of the patient's right knee. She is noted on the lateral view to have moderate at this early significant patellofemoral joint space narrowing as well as spur formation of both the superior and inferior poles. She has questionable loose body versus osteophyte posteriorly which appears to be chronic in nature. Patient has on AP view some very modest minimal joint space narrowing but these were supine views.     Impression MRI KNEE RIGHT WO CONTRAST  1/12/2022    Radial tear of the posterior horn of the medial meniscus near the posterior  root.  Mild medial extrusion of the body of the medial meniscus. Osteoarthritis of the knee, most severely affecting the patellofemoral  compartment. Small to moderate size knee joint effusion.  Subcentimeter foci of  intra-articular bodies along the posterior margin of the posterior horn of  the medial meniscus. Lab Results   Component Value Date    WBC 8.2 01/18/2022    HGB 13.1 01/18/2022    HCT 39.2 01/18/2022    MCV 80.7 01/18/2022     01/18/2022       PAST MEDICAL HISTORY     Past Medical History:   Diagnosis Date    Dizzy spells     Hypertension     Low blood potassium     Mass     rt.  thigh    Migraine     Osteoarthritis     hip    Vitamin D deficiency        SURGICAL HISTORY       Past Surgical History:   Procedure Laterality Date    CYST REMOVAL Left     ring finger    OTHER SURGICAL HISTORY Right 04/15/2014    removal of lesions X4    WISDOM TOOTH EXTRACTION         FAMILY HISTORY       Family History   Problem Relation Age of Onset    High Blood Pressure Mother     Arthritis Mother     Asthma Mother     Heart Disease Father     High Blood Pressure Brother        SOCIAL HISTORY       Social History     Socioeconomic History    Marital status:      Spouse name: None    (See Comments)     Unsure of reaction       Current Outpatient Medications on File Prior to Encounter   Medication Sig Dispense Refill    verapamil (CALAN) 120 MG tablet Take 120 mg by mouth daily      predniSONE (DELTASONE) 10 MG tablet Take 10 mg by mouth daily      rizatriptan (MAXALT) 10 MG tablet Take 10 mg by mouth once as needed for Migraine May repeat in 2 hours if needed      famotidine (PEPCID) 20 MG tablet Take 20 mg by mouth daily      Acetaminophen (TYLENOL ARTHRITIS PAIN PO) Take 650 mg by mouth as needed      albuterol sulfate  (90 Base) MCG/ACT inhaler Inhale 2 puffs into the lungs 4 times daily as needed for Wheezing 1 Inhaler 0    amitriptyline (ELAVIL) 25 MG tablet TAKE 1 TABLET BY MOUTH EVERY NIGHT 30 tablet 0    SUMAtriptan (IMITREX) 50 MG tablet Take 1 tablet by mouth once as needed for Migraine Do not takes More than 2 pills a day 9 tablet 3    pantoprazole (PROTONIX) 40 MG tablet Take 1 tablet by mouth every morning (before breakfast) 90 tablet 1    losartan-hydrochlorothiazide (HYZAAR) 100-12.5 MG per tablet Take 1 tablet by mouth daily 30 tablet 0     No current facility-administered medications on file prior to encounter. General health:  Fairly good. No fever or chills. Skin:  No itching, redness or rash. HEENT:  No headache, epistaxis or sore throat. Neck:  No pain, stiffness or masses. Cardiovascular/Respiratory system:  No chest pain, palpitation or shortness of breath. Gastrointestinal tract: No abdominal pain, Dysphagia, nausea, vomiting, diarrhea or constipation. Genitourinary:  No burning on micturition. No hesitancy, urgency, frequency or discoloration of urine. Locomotor:  See HPI. Neuropsychiatric:  No referable complaints.                  GENERAL PHYSICAL EXAM:     Vitals: BP (!) 142/88   Pulse 83   Temp 98.9 °F (37.2 °C) (Temporal)   Resp 18   Ht 5' 4\" (1.626 m)   Wt 230 lb (104.3 kg)   LMP 12/29/2021 (Exact Date)   SpO2 99%   BMI 39.48 kg/m²  Body mass index is 39.48 kg/m². GENERAL APPEARANCE:   Blane Holland is 55 y.o.,  female, moderately obese, nourished, conscious, alert. Does not appear to be distress or pain at this time. SKIN:  Warm, dry, no cyanosis or jaundice. HEAD:  Normocephalic, atraumatic, no swelling or tenderness. EYES:  Pupils equal, reactive to light. EARS:  No discharge, no marked hearing loss. NOSE:  No rhinorrhea, epistaxis or septal deformity. THROAT:  Not congested. No ulceration bleeding or discharge. NECK:  No stiffness, trachea central.                  CHEST:  Symmetrical and equal on expansion. HEART:  RRR S1 > S2. No audible murmurs or gallops. LUNGS:  Equal on expansion, normal breath sounds. No adventitious sounds. ABDOMEN:  Obese. Soft on palpation. No localized tenderness. No guarding or rigidity. LYMPHATICS:  No palpable cervical lymphadenopathy. LOCOMOTOR, BACK AND SPINE:  No tenderness or deformities. EXTREMITIES:  Symmetrical, no pretibial edema. No calf tenderness. No discoloration or ulcerations. Tenderness on palpation of the right Knee joint space worse on the medial and lateral   aspect. NEUROLOGIC:  The patient is conscious, alert, oriented,Cranial nerve II-XII intact, taste and smell were not examined. No apparent focal sensory or motor deficits.                                                                                      PROVISIONAL DIAGNOSES / SURGERY:          KNEE ARTHROSCOPY PARTIAL MEDIAL MENISCECTOMY-Right     Pain of right lower extremity     Right knee pain, unspecified chronicity    Patient Active Problem List    Diagnosis Date Noted    Soft tissue neoplasm 03/31/2014    Vitamin D deficiency 02/17/2014    Migraine     Hypertension     Osteoarthritis            LINDA SHARMA, APRN - CNP on 1/18/2022 at 1:52 PM

## 2022-01-17 NOTE — H&P (VIEW-ONLY)
HISTORY and [facility]       NAME:  Gris Briseno  MRN: [id number]   YOB: 1975   Date: 1/18/2022   Age: 55 y.o. Gender: female       COMPLAINT AND PRESENT HISTORY:    Gris Briseno is 55 y.o.,  female, undergoing preadmission testing for right Knee Meniscus Tear,  Pain of right lower extremity and Right knee pain. Patient will be having:   KNEE ARTHROSCOPY PARTIAL MEDIAL MENISCECTOMY-Right    Patient had injury to the  right knee. Patient reports popping her kneecap 24 years ago. Patient denies any prior knee surgery. Patient c/o  pain , swelling, stiffness, popping and cracking in the right Knee. Pt describes the pain as 7/10 in intensity on average. Onset of symptoms started 4 months ago, that has progressively gotten worse. Patient states that in December 2021 went to ER,  XRAYS and blood work taken and was then discovered she had a mensicus tear. Patient has been using  Tylenol Arthritis to help in pain relief. Patient reports that  Prolonged standing or walking aggravates sxs. Pt denies any paresthesia. The knee does buckle or give way she reports a couple of times. Pt reports that just last night she experienced  locking up of the knee. No recent falls or trauma. No redness or rashes. No fever or chills. Significant medical history:  Hypertension- is on Hyzaar. Migraines- pt is presently on prednisone    Patient states that she has sporadic chest pain, she has notified her doctor. Patient admits to [facility]  and uses an inhaler. Denies current chest pain, palpitations, , dizziness, leg swelling, headache. Anticoagulants or blood thinners: No   Patient denies any personal or family problems with anesthesia. Patient had Labs and EKG done with NSR. No surgical clearance required.      BP Readings from Last 3 Encounters:   01/18/22 (!) 142/88   12/22/21 138/81   07/02/19 (!) 153/93     Labs and Imaging:   XR taken today: No x-rays taken today however patient did have x-rays taken at [facility] emergency room on December 22, 2021. He show AP oblique and lateral views of the patient's right knee. She is noted on the lateral view to have moderate at this early significant patellofemoral joint space narrowing as well as spur formation of both the superior and inferior poles. She has questionable loose body versus osteophyte posteriorly which appears to be chronic in nature. Patient has on AP view some very modest minimal joint space narrowing but these were supine views.     Impression MRI KNEE RIGHT WO CONTRAST  1/12/2022    Radial tear of the posterior horn of the medial meniscus near the posterior  root.  Mild medial extrusion of the body of the medial meniscus. Osteoarthritis of the knee, most severely affecting the patellofemoral  compartment. Small to moderate size knee joint effusion.  Subcentimeter foci of  intra-articular bodies along the posterior margin of the posterior horn of  the medial meniscus. Lab Results   Component Value Date    WBC 8.2 01/18/2022    HGB 13.1 01/18/2022    HCT 39.2 01/18/2022    MCV 80.7 01/18/2022     01/18/2022       PAST MEDICAL HISTORY     Past Medical History:   Diagnosis Date    Dizzy spells     Hypertension     Low blood potassium     Mass     rt.  thigh    Migraine     Osteoarthritis     hip    Vitamin D deficiency        SURGICAL HISTORY       Past Surgical History:   Procedure Laterality Date    CYST REMOVAL Left     ring finger    OTHER SURGICAL HISTORY Right 04/15/2014    removal of lesions X4    WISDOM TOOTH EXTRACTION         FAMILY HISTORY       Family History   Problem Relation Age of Onset    High Blood Pressure Mother     Arthritis Mother     Asthma Mother     Heart Disease Father     High Blood Pressure Brother        SOCIAL HISTORY       Social History     Socioeconomic History    Marital status:      Spouse name: None    Number of children: None    Years of education: None    Highest education level: None   Occupational History    None   Tobacco Use    Smoking status: Never Smoker    Smokeless tobacco: Never Used   Vaping Use    Vaping Use: Never used   Substance and Sexual Activity    Alcohol use: No    Drug use: No    Sexual activity: None   Other Topics Concern    None   Social History Narrative    None     Social Determinants of Health     Financial Resource Strain:     Difficulty of Paying Living Expenses: Not on file   Food Insecurity:     Worried About Running Out of Food in the Last Year: Not on file    Kita of Food in the Last Year: Not on file   Transportation Needs:     Lack of Transportation (Medical): Not on file    Lack of Transportation (Non-Medical):  Not on file   Physical Activity:     Days of Exercise per Week: Not on file    Minutes of Exercise per Session: Not on file   Stress:     Feeling of Stress : Not on file   Social Connections:     Frequency of Communication with Friends and Family: Not on file    Frequency of Social Gatherings with Friends and Family: Not on file    Attends [employer]ism Services: Not on file    Active Member of [address] [place] [address] or Organizations: Not on file    Attends Club or Organization Meetings: Not on file    Marital Status: Not on file   Intimate Partner Violence:     Fear of Current or Ex-Partner: Not on file    Emotionally Abused: Not on file    Physically Abused: Not on file    Sexually Abused: Not on file   Housing Stability:     Unable to Pay for Housing in the Last Year: Not on file    Number of [place] in the Last Year: Not on file    Unstable Housing in the Last Year: Not on file        REVIEW OF SYSTEMS      Allergies   Allergen Reactions    Aspirin Other (See Comments)     Mouth breaks out    Codeine Other (See Comments)     Patient has bad stomach pain    Motrin [Ibuprofen] Other (See Comments)     Mouth breaks out    Naprosyn [Naproxen] Other (See Comments)     Unsure of reaction       Current Outpatient Medications on File Prior to Encounter   Medication Sig Dispense Refill    verapamil (CALAN) 120 MG tablet Take 120 mg by mouth daily      predniSONE (DELTASONE) 10 MG tablet Take 10 mg by mouth daily      rizatriptan (MAXALT) 10 MG tablet Take 10 mg by mouth once as needed for Migraine May repeat in 2 hours if needed      famotidine (PEPCID) 20 MG tablet Take 20 mg by mouth daily      Acetaminophen (TYLENOL ARTHRITIS PAIN PO) Take 650 mg by mouth as needed      albuterol sulfate  (90 Base) MCG/ACT inhaler Inhale 2 puffs into the lungs 4 times daily as needed for Wheezing 1 Inhaler 0    amitriptyline (ELAVIL) 25 MG tablet TAKE 1 TABLET BY MOUTH EVERY NIGHT 30 tablet 0    SUMAtriptan (IMITREX) 50 MG tablet Take 1 tablet by mouth once as needed for Migraine Do not takes More than 2 pills a day 9 tablet 3    pantoprazole (PROTONIX) 40 MG tablet Take 1 tablet by mouth every morning (before breakfast) 90 tablet 1    losartan-hydrochlorothiazide (HYZAAR) 100-12.5 MG per tablet Take 1 tablet by mouth daily 30 tablet 0     No current facility-administered medications on file prior to encounter. General health:  Fairly good. No fever or chills. Skin:  No itching, redness or rash. HEENT:  No headache, epistaxis or sore throat. Neck:  No pain, stiffness or masses. Cardiovascular/Respiratory system:  No chest pain, palpitation or shortness of breath. Gastrointestinal tract: No abdominal pain, Dysphagia, nausea, vomiting, diarrhea or constipation. Genitourinary:  No burning on micturition. No hesitancy, urgency, frequency or discoloration of urine. Locomotor:  See HPI. Neuropsychiatric:  No referable complaints.                  GENERAL PHYSICAL EXAM:     Vitals: BP (!) 142/88   Pulse 83   Temp 98.9 °F (37.2 °C) (Temporal)   Resp 18   Ht 5' 4\" (1.626 m)   Wt 230 lb (104.3 kg)   LMP 12/29/2021 (Exact Date)   SpO2 99%   BMI 39.48 kg/m²  Body mass index is 39.48 kg/m². GENERAL APPEARANCE:   Monse Smyth is 55 y.o.,  female, moderately obese, nourished, conscious, alert. Does not appear to be distress or pain at this time. SKIN:  Warm, dry, no cyanosis or jaundice. HEAD:  Normocephalic, atraumatic, no swelling or tenderness. EYES:  Pupils equal, reactive to light. EARS:  No discharge, no marked hearing loss. NOSE:  No rhinorrhea, epistaxis or septal deformity. THROAT:  Not congested. No ulceration bleeding or discharge. NECK:  No stiffness, trachea central.                  CHEST:  Symmetrical and equal on expansion. HEART:  RRR S1 > S2. No audible murmurs or gallops. LUNGS:  Equal on expansion, normal breath sounds. No adventitious sounds. ABDOMEN:  Obese. Soft on palpation. No localized tenderness. No guarding or rigidity. LYMPHATICS:  No palpable cervical lymphadenopathy. LOCOMOTOR, BACK AND SPINE:  No tenderness or deformities. EXTREMITIES:  Symmetrical, no pretibial edema. No calf tenderness. No discoloration or ulcerations. Tenderness on palpation of the right Knee joint space worse on the medial and lateral   aspect. NEUROLOGIC:  The patient is conscious, alert, oriented,Cranial nerve II-XII intact, taste and smell were not examined. No apparent focal sensory or motor deficits.                                                                                      PROVISIONAL DIAGNOSES / SURGERY:          KNEE ARTHROSCOPY PARTIAL MEDIAL MENISCECTOMY-Right     Pain of right lower extremity     Right knee pain, unspecified chronicity    Patient Active Problem List    Diagnosis Date Noted    Soft tissue neoplasm 03/31/2014    Vitamin D deficiency 02/17/2014    Migraine     Hypertension     Osteoarthritis            LINDA SHARMA, APRN - CNP on 1/18/2022 at 1:52 PM

## 2022-01-18 ENCOUNTER — HOSPITAL ENCOUNTER (OUTPATIENT)
Dept: PREADMISSION TESTING | Age: 47
Discharge: HOME | End: 2022-01-22
Payer: COMMERCIAL

## 2022-01-18 VITALS
HEART RATE: 83 BPM | WEIGHT: 230 LBS | TEMPERATURE: 98.9 F | BODY MASS INDEX: 39.27 KG/M2 | OXYGEN SATURATION: 99 % | HEIGHT: 64 IN | RESPIRATION RATE: 18 BRPM | SYSTOLIC BLOOD PRESSURE: 142 MMHG | DIASTOLIC BLOOD PRESSURE: 88 MMHG

## 2022-01-18 LAB
ABSOLUTE EOS #: 0.2 K/UL (ref 0–0.4)
ABSOLUTE IMMATURE GRANULOCYTE: ABNORMAL K/UL (ref 0–0.3)
ABSOLUTE LYMPH #: 1.4 K/UL (ref 1–4.8)
ABSOLUTE MONO #: 0.3 K/UL (ref 0.1–1.3)
ANION GAP SERPL CALCULATED.3IONS-SCNC: 15 MMOL/L (ref 9–17)
BASOPHILS # BLD: 1 % (ref 0–2)
BASOPHILS ABSOLUTE: 0 K/UL (ref 0–0.2)
BUN BLDV-MCNC: 14 MG/DL (ref 6–20)
BUN/CREAT BLD: ABNORMAL (ref 9–20)
CALCIUM SERPL-MCNC: 9.2 MG/DL (ref 8.6–10.4)
CHLORIDE BLD-SCNC: 105 MMOL/L (ref 98–107)
CO2: 22 MMOL/L (ref 20–31)
CREAT SERPL-MCNC: 0.67 MG/DL (ref 0.5–0.9)
DIFFERENTIAL TYPE: ABNORMAL
EOSINOPHILS RELATIVE PERCENT: 2 % (ref 0–4)
GFR AFRICAN AMERICAN: >60 ML/MIN
GFR NON-AFRICAN AMERICAN: >60 ML/MIN
GFR SERPL CREATININE-BSD FRML MDRD: ABNORMAL ML/MIN/{1.73_M2}
GFR SERPL CREATININE-BSD FRML MDRD: ABNORMAL ML/MIN/{1.73_M2}
GLUCOSE BLD-MCNC: 126 MG/DL (ref 70–99)
HCT VFR BLD CALC: 39.2 % (ref 36–46)
HEMOGLOBIN: 13.1 G/DL (ref 12–16)
IMMATURE GRANULOCYTES: ABNORMAL %
LYMPHOCYTES # BLD: 17 % (ref 24–44)
MCH RBC QN AUTO: 27 PG (ref 26–34)
MCHC RBC AUTO-ENTMCNC: 33.4 G/DL (ref 31–37)
MCV RBC AUTO: 80.7 FL (ref 80–100)
MONOCYTES # BLD: 4 % (ref 1–7)
NRBC AUTOMATED: ABNORMAL PER 100 WBC
PDW BLD-RTO: 15.3 % (ref 11.5–14.9)
PLATELET # BLD: 332 K/UL (ref 150–450)
PLATELET ESTIMATE: ABNORMAL
PMV BLD AUTO: 8 FL (ref 6–12)
POTASSIUM SERPL-SCNC: 3.9 MMOL/L (ref 3.7–5.3)
RBC # BLD: 4.86 M/UL (ref 4–5.2)
RBC # BLD: ABNORMAL 10*6/UL
SEG NEUTROPHILS: 76 % (ref 36–66)
SEGMENTED NEUTROPHILS ABSOLUTE COUNT: 6.3 K/UL (ref 1.3–9.1)
SODIUM BLD-SCNC: 142 MMOL/L (ref 135–144)
WBC # BLD: 8.2 K/UL (ref 3.5–11)
WBC # BLD: ABNORMAL 10*3/UL

## 2022-01-18 PROCEDURE — 99203 OFFICE O/P NEW LOW 30 MIN: CPT | Performed by: NURSE PRACTITIONER

## 2022-01-18 PROCEDURE — 80048 BASIC METABOLIC PNL TOTAL CA: CPT

## 2022-01-18 PROCEDURE — 93005 ELECTROCARDIOGRAM TRACING: CPT | Performed by: NURSE PRACTITIONER

## 2022-01-18 PROCEDURE — 85025 COMPLETE CBC W/AUTO DIFF WBC: CPT

## 2022-01-18 PROCEDURE — 36415 COLL VENOUS BLD VENIPUNCTURE: CPT

## 2022-01-18 RX ORDER — PREDNISONE 10 MG/1
10 TABLET ORAL DAILY
COMMUNITY

## 2022-01-18 RX ORDER — RIZATRIPTAN BENZOATE 10 MG/1
10 TABLET ORAL
COMMUNITY

## 2022-01-18 RX ORDER — FAMOTIDINE 20 MG/1
20 TABLET, FILM COATED ORAL DAILY
COMMUNITY

## 2022-01-18 RX ORDER — VERAPAMIL HYDROCHLORIDE 120 MG/1
120 TABLET, FILM COATED ORAL DAILY
COMMUNITY

## 2022-01-19 LAB
EKG ATRIAL RATE: 76 BPM
EKG P AXIS: 59 DEGREES
EKG P-R INTERVAL: 186 MS
EKG Q-T INTERVAL: 400 MS
EKG QRS DURATION: 90 MS
EKG QTC CALCULATION (BAZETT): 450 MS
EKG R AXIS: 55 DEGREES
EKG T AXIS: 12 DEGREES
EKG VENTRICULAR RATE: 76 BPM

## 2022-01-19 PROCEDURE — 93010 ELECTROCARDIOGRAM REPORT: CPT | Performed by: INTERNAL MEDICINE

## 2022-01-27 ENCOUNTER — HOSPITAL ENCOUNTER (OUTPATIENT)
Dept: LAB | Age: 47
Setting detail: SPECIMEN
Discharge: HOME | End: 2022-01-27
Payer: COMMERCIAL

## 2022-01-27 DIAGNOSIS — Z01.818 PRE-OP TESTING: Primary | ICD-10-CM

## 2022-01-27 PROCEDURE — U0003 INFECTIOUS AGENT DETECTION BY NUCLEIC ACID (DNA OR RNA); SEVERE ACUTE RESPIRATORY SYNDROME CORONAVIRUS 2 (SARS-COV-2) (CORONAVIRUS DISEASE [COVID-19]), AMPLIFIED PROBE TECHNIQUE, MAKING USE OF HIGH THROUGHPUT TECHNOLOGIES AS DESCRIBED BY CMS-2020-01-R: HCPCS

## 2022-01-27 PROCEDURE — U0005 INFEC AGEN DETEC AMPLI PROBE: HCPCS

## 2022-01-28 LAB
SARS-COV-2: NORMAL
SARS-COV-2: NOT DETECTED
SOURCE: NORMAL

## 2022-01-30 ENCOUNTER — ANESTHESIA EVENT (OUTPATIENT)
Dept: OPERATING ROOM | Age: 47
End: 2022-01-30
Payer: COMMERCIAL

## 2022-01-31 ENCOUNTER — ANESTHESIA (OUTPATIENT)
Dept: OPERATING ROOM | Age: 47
End: 2022-01-31
Payer: COMMERCIAL

## 2022-01-31 ENCOUNTER — HOSPITAL ENCOUNTER (OUTPATIENT)
Age: 47
Setting detail: OUTPATIENT SURGERY
Discharge: HOME | End: 2022-01-31
Attending: ORTHOPAEDIC SURGERY | Admitting: ORTHOPAEDIC SURGERY
Payer: COMMERCIAL

## 2022-01-31 VITALS — SYSTOLIC BLOOD PRESSURE: 117 MMHG | DIASTOLIC BLOOD PRESSURE: 77 MMHG | TEMPERATURE: 98.6 F | OXYGEN SATURATION: 98 %

## 2022-01-31 VITALS
HEART RATE: 73 BPM | HEIGHT: 64 IN | WEIGHT: 230 LBS | RESPIRATION RATE: 14 BRPM | SYSTOLIC BLOOD PRESSURE: 158 MMHG | DIASTOLIC BLOOD PRESSURE: 89 MMHG | TEMPERATURE: 96.5 F | OXYGEN SATURATION: 99 % | BODY MASS INDEX: 39.27 KG/M2

## 2022-01-31 DIAGNOSIS — S83.241A ACUTE MEDIAL MENISCUS TEAR OF RIGHT KNEE, INITIAL ENCOUNTER: Primary | ICD-10-CM

## 2022-01-31 LAB
-: NORMAL
HCG, PREGNANCY URINE (POC): NEGATIVE

## 2022-01-31 PROCEDURE — 3700000001 HC ADD 15 MINUTES (ANESTHESIA): Performed by: ORTHOPAEDIC SURGERY

## 2022-01-31 PROCEDURE — 29881 ARTHRS KNE SRG MNISECTMY M/L: CPT | Performed by: ORTHOPAEDIC SURGERY

## 2022-01-31 PROCEDURE — 3600000013 HC SURGERY LEVEL 3 ADDTL 15MIN: Performed by: ORTHOPAEDIC SURGERY

## 2022-01-31 PROCEDURE — 6360000002 HC RX W HCPCS

## 2022-01-31 PROCEDURE — 6370000000 HC RX 637 (ALT 250 FOR IP): Performed by: ANESTHESIOLOGY

## 2022-01-31 PROCEDURE — 2500000003 HC RX 250 WO HCPCS

## 2022-01-31 PROCEDURE — 2709999900 HC NON-CHARGEABLE SUPPLY: Performed by: ORTHOPAEDIC SURGERY

## 2022-01-31 PROCEDURE — 3700000000 HC ANESTHESIA ATTENDED CARE: Performed by: ORTHOPAEDIC SURGERY

## 2022-01-31 PROCEDURE — 7100000000 HC PACU RECOVERY - FIRST 15 MIN: Performed by: ORTHOPAEDIC SURGERY

## 2022-01-31 PROCEDURE — 7100000030 HC ASPR PHASE II RECOVERY - FIRST 15 MIN: Performed by: ORTHOPAEDIC SURGERY

## 2022-01-31 PROCEDURE — 7100000010 HC PHASE II RECOVERY - FIRST 15 MIN: Performed by: ORTHOPAEDIC SURGERY

## 2022-01-31 PROCEDURE — 3600000003 HC SURGERY LEVEL 3 BASE: Performed by: ORTHOPAEDIC SURGERY

## 2022-01-31 PROCEDURE — 7100000011 HC PHASE II RECOVERY - ADDTL 15 MIN: Performed by: ORTHOPAEDIC SURGERY

## 2022-01-31 PROCEDURE — 7100000001 HC PACU RECOVERY - ADDTL 15 MIN: Performed by: ORTHOPAEDIC SURGERY

## 2022-01-31 PROCEDURE — 2580000003 HC RX 258: Performed by: ANESTHESIOLOGY

## 2022-01-31 PROCEDURE — 6360000002 HC RX W HCPCS: Performed by: ORTHOPAEDIC SURGERY

## 2022-01-31 PROCEDURE — 7100000031 HC ASPR PHASE II RECOVERY - ADDTL 15 MIN: Performed by: ORTHOPAEDIC SURGERY

## 2022-01-31 PROCEDURE — 81025 URINE PREGNANCY TEST: CPT

## 2022-01-31 RX ORDER — SODIUM CHLORIDE 0.9 % (FLUSH) 0.9 %
5-40 SYRINGE (ML) INJECTION EVERY 12 HOURS SCHEDULED
Status: DISCONTINUED | OUTPATIENT
Start: 2022-01-31 | End: 2022-01-31 | Stop reason: HOSPADM

## 2022-01-31 RX ORDER — PROPOFOL 10 MG/ML
INJECTION, EMULSION INTRAVENOUS PRN
Status: DISCONTINUED | OUTPATIENT
Start: 2022-01-31 | End: 2022-01-31 | Stop reason: SDUPTHER

## 2022-01-31 RX ORDER — DEXAMETHASONE SODIUM PHOSPHATE 4 MG/ML
INJECTION, SOLUTION INTRA-ARTICULAR; INTRALESIONAL; INTRAMUSCULAR; INTRAVENOUS; SOFT TISSUE PRN
Status: DISCONTINUED | OUTPATIENT
Start: 2022-01-31 | End: 2022-01-31 | Stop reason: SDUPTHER

## 2022-01-31 RX ORDER — LIDOCAINE HYDROCHLORIDE 10 MG/ML
1 INJECTION, SOLUTION EPIDURAL; INFILTRATION; INTRACAUDAL; PERINEURAL
Status: DISCONTINUED | OUTPATIENT
Start: 2022-01-31 | End: 2022-01-31 | Stop reason: HOSPADM

## 2022-01-31 RX ORDER — ONDANSETRON 2 MG/ML
4 INJECTION INTRAMUSCULAR; INTRAVENOUS
Status: DISCONTINUED | OUTPATIENT
Start: 2022-01-31 | End: 2022-01-31 | Stop reason: HOSPADM

## 2022-01-31 RX ORDER — HYDROCODONE BITARTRATE AND ACETAMINOPHEN 5; 325 MG/1; MG/1
1 TABLET ORAL EVERY 4 HOURS PRN
Qty: 30 TABLET | Refills: 0 | Status: SHIPPED | OUTPATIENT
Start: 2022-01-31 | End: 2022-02-05

## 2022-01-31 RX ORDER — FENTANYL CITRATE 50 UG/ML
INJECTION, SOLUTION INTRAMUSCULAR; INTRAVENOUS PRN
Status: DISCONTINUED | OUTPATIENT
Start: 2022-01-31 | End: 2022-01-31 | Stop reason: SDUPTHER

## 2022-01-31 RX ORDER — DIPHENHYDRAMINE HYDROCHLORIDE 50 MG/ML
12.5 INJECTION INTRAMUSCULAR; INTRAVENOUS
Status: DISCONTINUED | OUTPATIENT
Start: 2022-01-31 | End: 2022-01-31 | Stop reason: HOSPADM

## 2022-01-31 RX ORDER — SODIUM CHLORIDE, SODIUM LACTATE, POTASSIUM CHLORIDE, CALCIUM CHLORIDE 600; 310; 30; 20 MG/100ML; MG/100ML; MG/100ML; MG/100ML
INJECTION, SOLUTION INTRAVENOUS CONTINUOUS
Status: DISCONTINUED | OUTPATIENT
Start: 2022-01-31 | End: 2022-01-31 | Stop reason: HOSPADM

## 2022-01-31 RX ORDER — LIDOCAINE HYDROCHLORIDE 10 MG/ML
INJECTION, SOLUTION EPIDURAL; INFILTRATION; INTRACAUDAL; PERINEURAL PRN
Status: DISCONTINUED | OUTPATIENT
Start: 2022-01-31 | End: 2022-01-31 | Stop reason: SDUPTHER

## 2022-01-31 RX ORDER — SODIUM CHLORIDE 9 MG/ML
25 INJECTION, SOLUTION INTRAVENOUS PRN
Status: DISCONTINUED | OUTPATIENT
Start: 2022-01-31 | End: 2022-01-31 | Stop reason: HOSPADM

## 2022-01-31 RX ORDER — MIDAZOLAM HYDROCHLORIDE 1 MG/ML
INJECTION INTRAMUSCULAR; INTRAVENOUS PRN
Status: DISCONTINUED | OUTPATIENT
Start: 2022-01-31 | End: 2022-01-31 | Stop reason: SDUPTHER

## 2022-01-31 RX ORDER — SODIUM CHLORIDE 0.9 % (FLUSH) 0.9 %
5-40 SYRINGE (ML) INJECTION PRN
Status: DISCONTINUED | OUTPATIENT
Start: 2022-01-31 | End: 2022-01-31 | Stop reason: HOSPADM

## 2022-01-31 RX ORDER — OXYCODONE HYDROCHLORIDE AND ACETAMINOPHEN 5; 325 MG/1; MG/1
2 TABLET ORAL PRN
Status: COMPLETED | OUTPATIENT
Start: 2022-01-31 | End: 2022-01-31

## 2022-01-31 RX ORDER — LABETALOL HYDROCHLORIDE 5 MG/ML
5 INJECTION, SOLUTION INTRAVENOUS EVERY 10 MIN PRN
Status: DISCONTINUED | OUTPATIENT
Start: 2022-01-31 | End: 2022-01-31 | Stop reason: HOSPADM

## 2022-01-31 RX ORDER — ONDANSETRON 2 MG/ML
INJECTION INTRAMUSCULAR; INTRAVENOUS PRN
Status: DISCONTINUED | OUTPATIENT
Start: 2022-01-31 | End: 2022-01-31 | Stop reason: SDUPTHER

## 2022-01-31 RX ORDER — OXYCODONE HYDROCHLORIDE AND ACETAMINOPHEN 5; 325 MG/1; MG/1
1 TABLET ORAL PRN
Status: COMPLETED | OUTPATIENT
Start: 2022-01-31 | End: 2022-01-31

## 2022-01-31 RX ORDER — ROPIVACAINE HYDROCHLORIDE 5 MG/ML
INJECTION, SOLUTION EPIDURAL; INFILTRATION; PERINEURAL PRN
Status: DISCONTINUED | OUTPATIENT
Start: 2022-01-31 | End: 2022-01-31 | Stop reason: ALTCHOICE

## 2022-01-31 RX ADMIN — ONDANSETRON 4 MG: 2 INJECTION INTRAMUSCULAR; INTRAVENOUS at 09:54

## 2022-01-31 RX ADMIN — PROPOFOL 30 MG: 10 INJECTION, EMULSION INTRAVENOUS at 10:03

## 2022-01-31 RX ADMIN — LIDOCAINE HYDROCHLORIDE 50 MG: 10 INJECTION, SOLUTION EPIDURAL; INFILTRATION; INTRACAUDAL; PERINEURAL at 09:50

## 2022-01-31 RX ADMIN — SODIUM CHLORIDE, POTASSIUM CHLORIDE, SODIUM LACTATE AND CALCIUM CHLORIDE: 600; 310; 30; 20 INJECTION, SOLUTION INTRAVENOUS at 08:32

## 2022-01-31 RX ADMIN — MIDAZOLAM 2 MG: 1 INJECTION INTRAMUSCULAR; INTRAVENOUS at 09:47

## 2022-01-31 RX ADMIN — PROPOFOL 130 MG: 10 INJECTION, EMULSION INTRAVENOUS at 09:50

## 2022-01-31 RX ADMIN — FENTANYL CITRATE 25 MCG: 50 INJECTION, SOLUTION INTRAMUSCULAR; INTRAVENOUS at 10:04

## 2022-01-31 RX ADMIN — FENTANYL CITRATE 50 MCG: 50 INJECTION, SOLUTION INTRAMUSCULAR; INTRAVENOUS at 09:50

## 2022-01-31 RX ADMIN — FENTANYL CITRATE 25 MCG: 50 INJECTION, SOLUTION INTRAMUSCULAR; INTRAVENOUS at 10:09

## 2022-01-31 RX ADMIN — OXYCODONE HYDROCHLORIDE AND ACETAMINOPHEN 2 TABLET: 5; 325 TABLET ORAL at 11:17

## 2022-01-31 RX ADMIN — DEXAMETHASONE SODIUM PHOSPHATE 8 MG: 4 INJECTION, SOLUTION INTRAMUSCULAR; INTRAVENOUS at 09:54

## 2022-01-31 ASSESSMENT — PAIN DESCRIPTION - DESCRIPTORS: DESCRIPTORS: SORE

## 2022-01-31 ASSESSMENT — PULMONARY FUNCTION TESTS
PIF_VALUE: 0
PIF_VALUE: 19
PIF_VALUE: 13
PIF_VALUE: 17
PIF_VALUE: 13
PIF_VALUE: 19
PIF_VALUE: 18
PIF_VALUE: 18
PIF_VALUE: 3
PIF_VALUE: 23
PIF_VALUE: 19
PIF_VALUE: 18
PIF_VALUE: 16
PIF_VALUE: 1
PIF_VALUE: 23
PIF_VALUE: 17
PIF_VALUE: 5
PIF_VALUE: 17
PIF_VALUE: 20
PIF_VALUE: 2
PIF_VALUE: 1
PIF_VALUE: 18
PIF_VALUE: 6
PIF_VALUE: 10
PIF_VALUE: 19
PIF_VALUE: 12
PIF_VALUE: 13
PIF_VALUE: 17
PIF_VALUE: 17
PIF_VALUE: 1
PIF_VALUE: 18
PIF_VALUE: 1
PIF_VALUE: 10
PIF_VALUE: 12
PIF_VALUE: 3
PIF_VALUE: 17
PIF_VALUE: 6

## 2022-01-31 ASSESSMENT — PAIN SCALES - GENERAL
PAINLEVEL_OUTOF10: 7
PAINLEVEL_OUTOF10: 7
PAINLEVEL_OUTOF10: 6
PAINLEVEL_OUTOF10: 2

## 2022-01-31 ASSESSMENT — PAIN DESCRIPTION - PAIN TYPE
TYPE: SURGICAL PAIN
TYPE: SURGICAL PAIN

## 2022-01-31 ASSESSMENT — PAIN DESCRIPTION - LOCATION
LOCATION: KNEE
LOCATION: KNEE

## 2022-01-31 ASSESSMENT — PAIN DESCRIPTION - ORIENTATION
ORIENTATION: RIGHT
ORIENTATION: RIGHT

## 2022-01-31 ASSESSMENT — PAIN - FUNCTIONAL ASSESSMENT: PAIN_FUNCTIONAL_ASSESSMENT: 0-10

## 2022-01-31 ASSESSMENT — PAIN DESCRIPTION - FREQUENCY: FREQUENCY: CONTINUOUS

## 2022-01-31 NOTE — INTERVAL H&P NOTE
Update History & Physical    The patient's History and Physical of January 18, 2022 was reviewed with the patient and I examined the patient. I concur with findings. There was no change. Here today for right knee arthroscopy partial medial meniscectomy. NPO since before midnight. Took verapamil, prednisone, and protonix this am with sip of water. Used albuterol inhaler this am. Denies taking any blood thinning medications. Pt denies recent or current chest pain/pressure, palpitations, SOB, N/V/D or constipation, recent URI, fever and chills. Review vitals per RN flowsheet.        Electronically signed by WILLIAM Strauss CNP on 1/31/2022 at 7:57 AM

## 2022-01-31 NOTE — ANESTHESIA PRE PROCEDURE
Department of Anesthesiology  Preprocedure Note       Name:  Suni Burgess   Age:  55 y.o.  :  1975                                          MRN:  [id number]         Date:  2022      Surgeon: Baljinder Young):  Toni Linder MD    Procedure: Procedure(s):  KNEE ARTHROSCOPY PARTIAL MEDIAL MENISCECTOMY    Medications prior to admission:   Prior to Admission medications    Medication Sig Start Date End Date Taking? Authorizing Provider   HYDROcodone-acetaminophen (NORCO) 5-325 MG per tablet Take 1 tablet by mouth every 4 hours as needed for Pain for up to 5 days. Intended supply: 5 days.  Take lowest dose possible to manage pain 22 Yes Toni Linder MD   verapamil (CALAN) 120 MG tablet Take 120 mg by mouth daily    Historical Provider, MD   predniSONE (DELTASONE) 10 MG tablet Take 10 mg by mouth daily    Historical Provider, MD   rizatriptan (MAXALT) 10 MG tablet Take 10 mg by mouth once as needed for Migraine May repeat in 2 hours if needed    Historical Provider, MD   famotidine (PEPCID) 20 MG tablet Take 20 mg by mouth daily    Historical Provider, MD   Acetaminophen (TYLENOL ARTHRITIS PAIN PO) Take 650 mg by mouth as needed    Historical Provider, MD   albuterol sulfate  (90 Base) MCG/ACT inhaler Inhale 2 puffs into the lungs 4 times daily as needed for Wheezing 19   Omi Beard MD   amitriptyline (ELAVIL) 25 MG tablet TAKE 1 TABLET BY MOUTH EVERY NIGHT 19   Jose Angel Altamirano MD   SUMAtriptan (IMITREX) 50 MG tablet Take 1 tablet by mouth once as needed for Migraine Do not takes More than 2 pills a day 3/28/19 3/28/19  Jose Angel Altamirano MD   pantoprazole (PROTONIX) 40 MG tablet Take 1 tablet by mouth every morning (before breakfast) 3/28/19   Jose Angel Altamirano MD   losartan-hydrochlorothiazide (HYZAAR) 100-12.5 MG per tablet Take 1 tablet by mouth daily 3/20/19   Augustine Irizarry MD       Current medications:    Current Facility-Administered Medications   Medication Dose Route Frequency Provider Last Rate Last Admin    lactated ringers infusion   IntraVENous Continuous Joanna Ontiveros  mL/hr at 01/31/22 0832 New Bag at 01/31/22 0832    sodium chloride flush 0.9 % injection 5-40 mL  5-40 mL IntraVENous 2 times per day Joanna Ontiveros MD        sodium chloride flush 0.9 % injection 5-40 mL  5-40 mL IntraVENous PRN Joanna Ontiveros MD        0.9 % sodium chloride infusion  25 mL IntraVENous PRN Joanna Ontiveros MD        lidocaine PF 1 % injection 1 mL  1 mL IntraDERmal Once PRN Joanna Ontiveros MD           Allergies: Allergies   Allergen Reactions    Aspirin Other (See Comments)     Mouth breaks out    Codeine Other (See Comments)     Patient has bad stomach pain    Motrin [Ibuprofen] Other (See Comments)     Mouth breaks out    Naprosyn [Naproxen] Other (See Comments)     Unsure of reaction       Problem List:    Patient Active Problem List   Diagnosis Code    Migraine G43.909    Hypertension I10    Osteoarthritis M19.90    Vitamin D deficiency E55.9    Soft tissue neoplasm D49.2       Past Medical History:        Diagnosis Date    Dizzy spells     Hypertension     Low blood potassium     Mass     rt. thigh    Migraine     Osteoarthritis     hip    Vitamin D deficiency        Past Surgical History:        Procedure Laterality Date    CYST REMOVAL Left     ring finger    OTHER SURGICAL HISTORY Right 04/15/2014    removal of lesions X4    WISDOM TOOTH EXTRACTION         Social History:    Social History     Tobacco Use    Smoking status: Never Smoker    Smokeless tobacco: Never Used   Substance Use Topics    Alcohol use:  No                                Counseling given: Not Answered      Vital Signs (Current):   Vitals:    01/31/22 0816   BP: (!) 146/88   Pulse: 88   Resp: 18   Temp: 96.9 °F (36.1 °C)   TempSrc: Temporal   SpO2: 97%   Weight: 230 lb (104.3 kg)   Height: 5' 4\" (1.626 m)                                              BP Readings from Last 3 Encounters:   01/31/22 (!) 146/88   01/18/22 (!) 142/88   12/22/21 138/81       NPO Status: Time of last liquid consumption: 0700 (meds with sip of water)                        Time of last solid consumption: 2300                        Date of last liquid consumption: 01/31/22                        Date of last solid food consumption: 01/30/22    BMI:   Wt Readings from Last 3 Encounters:   01/31/22 230 lb (104.3 kg)   01/18/22 230 lb (104.3 kg)   12/22/21 230 lb (104.3 kg)     Body mass index is 39.48 kg/m². CBC:   Lab Results   Component Value Date    WBC 8.2 01/18/2022    RBC 4.86 01/18/2022    HGB 13.1 01/18/2022    HCT 39.2 01/18/2022    MCV 80.7 01/18/2022    RDW 15.3 01/18/2022     01/18/2022       CMP:   Lab Results   Component Value Date     01/18/2022    K 3.9 01/18/2022     01/18/2022    CO2 22 01/18/2022    BUN 14 01/18/2022    CREATININE 0.67 01/18/2022    GFRAA >60 01/18/2022    LABGLOM >60 01/18/2022    GLUCOSE 126 01/18/2022    PROT 7.8 02/19/2020    CALCIUM 9.2 01/18/2022    BILITOT 0.33 02/19/2020    ALKPHOS 86 02/19/2020    AST 19 02/19/2020    ALT 24 02/19/2020       POC Tests: No results for input(s): POCGLU, POCNA, POCK, POCCL, POCBUN, POCHEMO, POCHCT in the last 72 hours.     Coags: No results found for: PROTIME, INR, APTT    HCG (If Applicable):   Lab Results   Component Value Date    HCG NEGATIVE 01/31/2022        ABGs: No results found for: PHART, PO2ART, IVW5TQX, ILE0QST, BEART, W8JEMDEG     Type & Screen (If Applicable):  No results found for: LABABO, LABRH    Drug/Infectious Status (If Applicable):  No results found for: HIV, HEPCAB    COVID-19 Screening (If Applicable):   Lab Results   Component Value Date    COVID19 Not Detected 01/27/2022           Anesthesia Evaluation  Patient summary reviewed and Nursing notes reviewed no history of anesthetic complications:   Airway: Mallampati: III  TM distance: >3 FB   Neck ROM: full  Mouth opening: > = 3 FB Dental:      Comment: States she has bad teeth, but denies any loose ones    Pulmonary:Negative Pulmonary ROS and normal exam  breath sounds clear to auscultation                             Cardiovascular:    (+) hypertension:,       ECG reviewed  Rhythm: regular  Rate: normal                    Neuro/Psych:   (+) headaches: migraine headaches,             GI/Hepatic/Renal:   (+) GERD:, morbid obesity          Endo/Other:    (+) : arthritis: OA., .                 Abdominal:             Vascular: negative vascular ROS. Other Findings:           Anesthesia Plan      general     ASA 3       Induction: intravenous. MIPS: Postoperative opioids intended and Prophylactic antiemetics administered. Anesthetic plan and risks discussed with patient. Plan discussed with CRNA.                   Luis Zambrano MD   1/31/2022

## 2022-01-31 NOTE — ANESTHESIA POSTPROCEDURE EVALUATION
Department of Anesthesiology  Postprocedure Note    Patient: Anh Metzger  MRN: [id number]  YOB: 1975  Date of evaluation: 1/31/2022  Time:  1:15 PM     Procedure Summary     Date: 01/31/22 Room / Location: [address] [place] [address]: AUSTIN QUINTERO    Anesthesia Start: 3843 Anesthesia Stop: 1024    Procedure: KNEE ARTHROSCOPY PARTIAL MEDIAL MENISCECTOMY (Right Knee) Diagnosis: (RIGHT KNEE MEDIAL MENISCUS TEAR)    Surgeons: Margarita Benito MD Responsible Provider: Dina Simmons MD    Anesthesia Type: general ASA Status: 3          Anesthesia Type: general    Carlos Phase I: Carlos Score: 9    Carlos Phase II: Carlos Score: 10    Last vitals: Reviewed and per EMR flowsheets.        Anesthesia Post Evaluation    Comments: POST- ANESTHESIA EVALUATION       Pt Name: Anh Metzger  MRN: [id number]  YOB: 1975  Date of evaluation: 1/31/2022  Time:  1:15 PM      BP (!) 158/89   Pulse 73   Temp 96.5 °F (35.8 °C) (Infrared)   Resp 14   Ht 5' 4\" (1.626 m)   Wt 230 lb (104.3 kg)   SpO2 99%   BMI 39.48 kg/m²      Consciousness Level  Awake  Cardiopulmonary Status  Stable  Pain Adequately Treated YES  Nausea / Vomiting  NO  Adequate Hydration  YES  Anesthesia Related Complications NONE      Electronically signed by Dina Simmons MD on 1/31/2022 at 1:15 PM

## 2022-01-31 NOTE — OP NOTE
Operative Note      Patient: Suni uBrgess  YOB: 1975  MRN: [id number]    Date of Procedure: 1/31/2022    Pre-Op Diagnosis: RIGHT KNEE MEDIAL MENISCUS TEAR    Post-Op Diagnosis: Same       Procedure(s):  KNEE ARTHROSCOPY PARTIAL MEDIAL MENISCECTOMY    Surgeon(s):  Toni Linder MD    Assistant:   Resident: Watson Dill DO    Anesthesia: General    Estimated Blood Loss (mL): Minimal    Complications: None    Specimens:   * No specimens in log *    Implants:  * No implants in log *      Drains: * No LDAs found *    Findings: Tear of the posterior horn medial meniscus    Detailed Description of Procedure:     Patient is a 55y.o. year old female who presents with a history of pain, locking, and giving away sensations of their right knee. Physical examination was positive for Thu's examination. MRI was consistent with a tear of the posterior horn of medial meniscus. Having failed conservative treatment, it was advised that arthroscopic examination and treatment of their knee would be beneficial and consent was obtained with risk and benefits explained to the patient. The patient was taken tot he operative room and general anesthesia was administered. A tourniquet was placed to the operatives lower extremity and then placed in the leg garcia. The leg was exsanguinated and the tourniquet inflated to the 300mmHg. The leg was the prepped and draped in the usual sterile fashion. Time-out was called to verify laterality. Medial and lateral portals were made and the scope placed in the lateral portal. The patella femoral joint was examined and noted grade 2 to early grade III chondromalacia of the lateral facet of patella. The scope was then passes down the medial gutter into the medial compartment. A probe was used to assess the medial meniscus and a tear was identified in the posterior horn near the root of the medial meniscus.  A partial medial menisectomy was carried ou with basket forceps and then smoothed out with a shaver. Repeat probing of the meniscus found it to be stable. There was was a small area of fibrillation of the articular cartilage which was lightly debrided. The arthroscope was then passed into the notch of the knee. The ACL was found not to have any significant damage or laxity. The scope was then passed in the lateral compartment. Thorough probing of the lateral meniscus and the articular cartilage did not demonstrate any significant finding that requires surgical intervention    The scope was then passed into the suprapatellar area and the shaver was used to remove any further soft tissue debris. The scope was removed and the knee evacuated of fluid and injected with 20cc of 0.5% ropivacaine. The sterile bulky dressing was applied and the leg then wrapped with a large 6-inch ace bandage from toes to the mid-thigh. The tourniquet was then deflated at less than 30 minutes. The patient was awaken and taken to the PACU in good condition.      Electronically signed by Miguel Brown MD on 1/31/2022 at 10:17 AM

## 2022-02-09 ENCOUNTER — OFFICE VISIT (OUTPATIENT)
Dept: ORTHOPEDIC SURGERY | Age: 47
End: 2022-02-09

## 2022-02-09 DIAGNOSIS — Z98.890 S/P RIGHT KNEE ARTHROSCOPY: Primary | ICD-10-CM

## 2022-02-09 PROCEDURE — 99024 POSTOP FOLLOW-UP VISIT: CPT | Performed by: ORTHOPAEDIC SURGERY

## 2022-02-09 NOTE — PROGRESS NOTES
Patient returns today status post left knee arthroscopy with partial (medial/lateral) meniscectomy. Patient has no major complaints other than expected tightness/swelling with ROM. Sharp/stabbing pain has improved. On exam, portal sites are without redness or drainage. No calf tenderness; negative Cornelio's sign. Motion is 0-100 degrees. No significant effusion. Assessment  Status post left knee arthroscopy    Plan  Patient given exercises to perform. Patient given activities/ motions to complete. Continue activities at home. Return to work. RTO PRN. Call with any future problems.

## 2022-03-04 ENCOUNTER — HOSPITAL ENCOUNTER (OUTPATIENT)
Age: 47
Discharge: HOME | End: 2022-03-04
Payer: COMMERCIAL

## 2022-03-04 LAB
ABSOLUTE EOS #: 0.2 K/UL (ref 0–0.4)
ABSOLUTE LYMPH #: 2 K/UL (ref 1–4.8)
ABSOLUTE MONO #: 0.4 K/UL (ref 0.1–1.3)
ALBUMIN SERPL-MCNC: 4.1 G/DL (ref 3.5–5.2)
ALP BLD-CCNC: 87 U/L (ref 35–104)
ALT SERPL-CCNC: 16 U/L (ref 5–33)
ANION GAP SERPL CALCULATED.3IONS-SCNC: 11 MMOL/L (ref 9–17)
AST SERPL-CCNC: 13 U/L
BASOPHILS # BLD: 1 % (ref 0–2)
BASOPHILS ABSOLUTE: 0.1 K/UL (ref 0–0.2)
BILIRUB SERPL-MCNC: 0.4 MG/DL (ref 0.3–1.2)
BUN BLDV-MCNC: 15 MG/DL (ref 6–20)
CALCIUM SERPL-MCNC: 8.7 MG/DL (ref 8.6–10.4)
CHLORIDE BLD-SCNC: 105 MMOL/L (ref 98–107)
CHOLESTEROL/HDL RATIO: 3.7
CHOLESTEROL: 195 MG/DL
CO2: 23 MMOL/L (ref 20–31)
CREAT SERPL-MCNC: 0.67 MG/DL (ref 0.5–0.9)
EOSINOPHILS RELATIVE PERCENT: 3 % (ref 0–4)
GFR AFRICAN AMERICAN: >60 ML/MIN
GFR NON-AFRICAN AMERICAN: >60 ML/MIN
GFR SERPL CREATININE-BSD FRML MDRD: NORMAL ML/MIN/{1.73_M2}
GLUCOSE BLD-MCNC: 95 MG/DL (ref 70–99)
HCT VFR BLD CALC: 37.9 % (ref 36–46)
HDLC SERPL-MCNC: 53 MG/DL
HEMOGLOBIN: 12.5 G/DL (ref 12–16)
LDL CHOLESTEROL: 121 MG/DL (ref 0–130)
LYMPHOCYTES # BLD: 32 % (ref 24–44)
MCH RBC QN AUTO: 27.1 PG (ref 26–34)
MCHC RBC AUTO-ENTMCNC: 33.1 G/DL (ref 31–37)
MCV RBC AUTO: 81.9 FL (ref 80–100)
MONOCYTES # BLD: 6 % (ref 1–7)
PDW BLD-RTO: 15.7 % (ref 11.5–14.9)
PLATELET # BLD: 313 K/UL (ref 150–450)
PMV BLD AUTO: 7.2 FL (ref 6–12)
POTASSIUM SERPL-SCNC: 4.3 MMOL/L (ref 3.7–5.3)
RBC # BLD: 4.63 M/UL (ref 4–5.2)
SEG NEUTROPHILS: 58 % (ref 36–66)
SEGMENTED NEUTROPHILS ABSOLUTE COUNT: 3.7 K/UL (ref 1.3–9.1)
SODIUM BLD-SCNC: 139 MMOL/L (ref 135–144)
TOTAL PROTEIN: 6.5 G/DL (ref 6.4–8.3)
TRIGL SERPL-MCNC: 103 MG/DL
TSH SERPL DL<=0.05 MIU/L-ACNC: 0.35 MIU/L (ref 0.3–5)
TSH SERPL DL<=0.05 MIU/L-ACNC: 0.36 MIU/L (ref 0.3–5)
WBC # BLD: 6.4 K/UL (ref 3.5–11)

## 2022-03-04 PROCEDURE — 82306 VITAMIN D 25 HYDROXY: CPT

## 2022-03-04 PROCEDURE — 36415 COLL VENOUS BLD VENIPUNCTURE: CPT

## 2022-03-04 PROCEDURE — 80053 COMPREHEN METABOLIC PANEL: CPT

## 2022-03-04 PROCEDURE — 85025 COMPLETE CBC W/AUTO DIFF WBC: CPT

## 2022-03-04 PROCEDURE — 84443 ASSAY THYROID STIM HORMONE: CPT

## 2022-03-04 PROCEDURE — 80061 LIPID PANEL: CPT

## 2022-03-05 LAB — VITAMIN D 25-HYDROXY: 11.4 NG/ML

## 2022-06-23 ENCOUNTER — APPOINTMENT (OUTPATIENT)
Dept: GENERAL RADIOLOGY | Age: 47
End: 2022-06-23
Payer: COMMERCIAL

## 2022-06-23 ENCOUNTER — HOSPITAL ENCOUNTER (EMERGENCY)
Age: 47
Discharge: HOME | End: 2022-06-23
Attending: EMERGENCY MEDICINE
Payer: COMMERCIAL

## 2022-06-23 VITALS
TEMPERATURE: 97.5 F | SYSTOLIC BLOOD PRESSURE: 166 MMHG | OXYGEN SATURATION: 99 % | DIASTOLIC BLOOD PRESSURE: 95 MMHG | HEART RATE: 75 BPM | WEIGHT: 230 LBS | BODY MASS INDEX: 39.27 KG/M2 | RESPIRATION RATE: 16 BRPM | HEIGHT: 64 IN

## 2022-06-23 DIAGNOSIS — M25.561 CHRONIC PAIN OF RIGHT KNEE: ICD-10-CM

## 2022-06-23 DIAGNOSIS — G89.29 CHRONIC PAIN OF RIGHT KNEE: ICD-10-CM

## 2022-06-23 DIAGNOSIS — R25.2 LEG CRAMPING: ICD-10-CM

## 2022-06-23 DIAGNOSIS — M25.552 LEFT HIP PAIN: Primary | ICD-10-CM

## 2022-06-23 LAB
ANION GAP SERPL CALCULATED.3IONS-SCNC: 10 MMOL/L (ref 9–17)
BUN BLDV-MCNC: 14 MG/DL (ref 6–20)
CALCIUM SERPL-MCNC: 9 MG/DL (ref 8.6–10.4)
CHLORIDE BLD-SCNC: 104 MMOL/L (ref 98–107)
CO2: 23 MMOL/L (ref 20–31)
CREAT SERPL-MCNC: 0.54 MG/DL (ref 0.5–0.9)
D-DIMER QUANTITATIVE: 0.33 MG/L FEU (ref 0–0.59)
GFR AFRICAN AMERICAN: >60 ML/MIN
GFR NON-AFRICAN AMERICAN: >60 ML/MIN
GFR SERPL CREATININE-BSD FRML MDRD: ABNORMAL ML/MIN/{1.73_M2}
GLUCOSE BLD-MCNC: 110 MG/DL (ref 70–99)
POTASSIUM SERPL-SCNC: 4 MMOL/L (ref 3.7–5.3)
SODIUM BLD-SCNC: 137 MMOL/L (ref 135–144)

## 2022-06-23 PROCEDURE — 73562 X-RAY EXAM OF KNEE 3: CPT

## 2022-06-23 PROCEDURE — 36415 COLL VENOUS BLD VENIPUNCTURE: CPT

## 2022-06-23 PROCEDURE — 73502 X-RAY EXAM HIP UNI 2-3 VIEWS: CPT

## 2022-06-23 PROCEDURE — 80048 BASIC METABOLIC PNL TOTAL CA: CPT

## 2022-06-23 PROCEDURE — 99284 EMERGENCY DEPT VISIT MOD MDM: CPT

## 2022-06-23 PROCEDURE — 85379 FIBRIN DEGRADATION QUANT: CPT

## 2022-06-23 RX ORDER — TIZANIDINE 4 MG/1
4 TABLET ORAL NIGHTLY PRN
Qty: 10 TABLET | Refills: 0 | Status: SHIPPED | OUTPATIENT
Start: 2022-06-23

## 2022-06-23 NOTE — ED PROVIDER NOTES
[address] ED  eMERGENCY dEPARTMENT eNCOUnter      Pt Name: Junior Epps  MRN: [id number]  [place] 1975  Date of evaluation: 6/23/2022  Provider: Gabriel Kiran PA-C    CHIEF COMPLAINT       Chief Complaint   Patient presents with    Leg Pain     bilateral    Hip Pain     left           HISTORY OF PRESENT ILLNESS  (Location/Symptom, Timing/Onset, Context/Setting, Quality, Duration, Modifying Factors, Severity.)   Junior Epps is a 52 y.o. female who presents to the emergency department for evaluation of right knee, right leg pain and left hip pain. Pt states she had meniscus surgery on right knee 1 year ago. Has been experiencing burning pain in right knee since. No recent injury. Pt is also have some bilateral posterior leg pain with cramping. Pain is worse with walking. Better with rest.   Additionally, pt reports left hip pain x several years. Has had xrays done. No recent injury. No back pain, loss of bowel or bladder control, numbness, weakness, fevers. Pt does take mobic. No other complaints. Nursing Notes were reviewed. REVIEW OF SYSTEMS    (2-9 systems for level 4, 10 or more for level 5)     Review of Systems   Knee pain  Leg pain   Hip pain       Except as noted above the remainder of the review of systems was reviewed and negative. PAST MEDICAL HISTORY     Past Medical History:   Diagnosis Date    Dizzy spells     Hypertension     Low blood potassium     Mass     rt.  thigh    Migraine     Osteoarthritis     hip    Vitamin D deficiency      None otherwise stated in nurses notes    SURGICAL HISTORY       Past Surgical History:   Procedure Laterality Date    CYST REMOVAL Left     ring finger    KNEE ARTHROSCOPY Right 1/31/2022    KNEE ARTHROSCOPY PARTIAL MEDIAL MENISCECTOMY performed by Danny Bright MD at [facility] Right 04/15/2014    removal of lesions X4    WISDOM TOOTH EXTRACTION       None otherwise stated in nurses notes    CURRENT MEDICATIONS       Discharge Medication List as of 2022  4:53 PM      CONTINUE these medications which have NOT CHANGED    Details   verapamil (CALAN) 120 MG tablet Take 120 mg by mouth dailyHistorical Med      predniSONE (DELTASONE) 10 MG tablet Take 10 mg by mouth dailyHistorical Med      rizatriptan (MAXALT) 10 MG tablet Take 10 mg by mouth once as needed for Migraine May repeat in 2 hours if neededHistorical Med      famotidine (PEPCID) 20 MG tablet Take 20 mg by mouth dailyHistorical Med      Acetaminophen (TYLENOL ARTHRITIS PAIN PO) Take 650 mg by mouth as neededHistorical Med      albuterol sulfate  (90 Base) MCG/ACT inhaler Inhale 2 puffs into the lungs 4 times daily as needed for Wheezing, Disp-1 Inhaler, R-0Print      amitriptyline (ELAVIL) 25 MG tablet TAKE 1 TABLET BY MOUTH EVERY NIGHT, Disp-30 tablet, R-0Normal      SUMAtriptan (IMITREX) 50 MG tablet Take 1 tablet by mouth once as needed for Migraine Do not takes More than 2 pills a day, Disp-9 tablet, R-3Normal      pantoprazole (PROTONIX) 40 MG tablet Take 1 tablet by mouth every morning (before breakfast), Disp-90 tablet, R-1Normal      losartan-hydrochlorothiazide (HYZAAR) 100-12.5 MG per tablet Take 1 tablet by mouth daily, Disp-30 tablet, R-0Print             ALLERGIES     Aspirin, Codeine, Motrin [ibuprofen], and Naprosyn [naproxen]    FAMILY HISTORY           Problem Relation Age of Onset    High Blood Pressure Mother     Arthritis Mother     Asthma Mother     Heart Disease Father     High Blood Pressure Brother      Family Status   Relation Name Status    Mother  Alive    Father      Brother  (Not Specified)      None otherwise stated in nurses notes    SOCIAL HISTORY      reports that she has never smoked. She has never used smokeless tobacco. She reports that she does not drink alcohol and does not use drugs.    lives at home with others     PHYSICAL EXAM    (up to 7 for level 4, 8 or more for level 5)     ED Triage Vitals [06/23/22 1517]   BP Temp Temp Source Heart Rate Resp SpO2 Height Weight   (!) 166/95 97.5 °F (36.4 °C) Temporal 75 16 99 % 5' 4\" (1.626 m) 230 lb (104.3 kg)       Physical Exam   Nursing note and vitals reviewed. Constitutional: Oriented to person, place, and time and well-developed, well-nourished. Head: Normocephalic and atraumatic. Ear: External ears normal.   Nose: Nose normal and midline. Eyes: Conjunctivae and EOM are normal. Pupils are equal, round, and reactive to light. Neck: Normal range of motion. Neck supple. Abdominal: Soft. No distension and no mass. There is no tenderness. There is no rebound and no guarding. Musculoskeletal: examination of right leg reveals no visible deformities, bruising, swelling, abrasions, erythema. Generalized tenderness of knee. There is some tenderness along hamstring. No calf pain. FROM. 5/5 strength. Brisk cap refill. Distal sensation intact. 2/2 dp and pt pulses. Ambulatory. Examination of left leg reveals no visible deformities, bruising, swelling, abrasions, erythema. Tenderness over lateral hip. No rashes. No pain in lumbar spine or buttock. Pain in left calf. 2/2 dp and pt pulses. Brisk cap refill. Distal sensation intact. Neurological: Alert and oriented to person, place, and time. GCS score is 15. Skin: Skin is warm and dry. No rash noted. No erythema. No pallor. Psychiatric: Mood, memory, affect and judgment normal.           DIAGNOSTIC RESULTS     EKG: All EKG's are interpreted by the Emergency Department Physician who either signs or Co-signs this chart in the absence of a cardiologist.        RADIOLOGY:   All plain film, CT, MRI, and formal ultrasound images (except ED bedside ultrasound) are read by the radiologist, see reports below, unless otherwise noted in MDM or here. XR KNEE RIGHT (3 VIEWS)   Preliminary Result   Moderate tricompartmental degenerative changes. No acute traumatic injury. XR HIP LEFT (2-3 VIEWS)   Final Result   No acute osseous abnormality. No results found. LABS:  Labs Reviewed   BASIC METABOLIC PANEL - Abnormal; Notable for the following components:       Result Value    Glucose 110 (*)     All other components within normal limits   D-DIMER, QUANTITATIVE       All other labs were within normal range or not returned as of this dictation. EMERGENCY DEPARTMENT COURSE and DIFFERENTIAL DIAGNOSIS/MDM:   Vitals:    Vitals:    06/23/22 1517   BP: (!) 166/95   Pulse: 75   Resp: 16   Temp: 97.5 °F (36.4 °C)   TempSrc: Temporal   SpO2: 99%   Weight: 230 lb (104.3 kg)   Height: 5' 4\" (1.626 m)         Patient instructed to return to the emergency room if symptoms worsen, return, or any other concern right away which is agreed by the patient    ED MEDS:  Orders Placed This Encounter   Medications    tiZANidine (ZANAFLEX) 4 MG tablet     Sig: Take 1 tablet by mouth nightly as needed (spasms)     Dispense:  10 tablet     Refill:  0         CONSULTS:  None    PROCEDURES:  None      FINAL IMPRESSION      1. Left hip pain    2. Chronic pain of right knee    3. Leg cramping          DISPOSITION/PLAN   DISPOSITION Decision To Discharge    PATIENT REFERRED TO:  Red Buchanan MD  [address] [facility] [id number] [place] [address] ED  [facility] [id number] [place] [address], [address]  [address]  [address] [address] [phone]    Call         DISCHARGE MEDICATIONS:  Discharge Medication List as of 6/23/2022  4:53 PM      START taking these medications    Details   tiZANidine (ZANAFLEX) 4 MG tablet Take 1 tablet by mouth nightly as needed (spasms), Disp-10 tablet, R-0Print               Summation      Patient Course:    Chronic pain in right knee and left hip. No injury. Cramping in legs. ddimer is negative. Low concern for DVT. No electrolyte abnormality.    Imaging shows arthritis with no acute etiology. No signs of septic joints, gout. Will refer to dr. Gwen Turner. zanaflex for cramping. Strict return precautions discussed with patient. She is agreeable. Discussed results and plan with the pt. They expressed appropriate understanding. Pt given close follow up, supportive care instructions and strict return instructions at the bedside. Wells DVT Criteria:  []YES  [x]NO :History of previous DVT (If yes 1 point)  []YES  [x]NO :Active cancer treatment ongoing or within the previous six months or palliative (If yes 1 point)  []YES  [x]NO :Paralysis, paresis, or recent plaster immobilization of the lower extremities  (If yes 1 point)  []YES  [x]NO :Recently bedridden for more than three days, or major surgery within twelve weeks (If yes 1 point)  [x]YES  []NO :Localized tenderness along the distribution of the deep venous system (If yes 1 point)  []YES  [x]NO :Entire leg swollen (If yes 1 point)  []YES  [x]NO :Calf swelling by more than 3 cm when compared to the asymptomatic leg measured 10 cm below tibial tuberosity (If yes 1 point)  []YES  [x]NO :Pitting edema in the symptomatic leg (If yes 1 point)  []YES  [x]NO :Collateral superficial veins (nonvaricose) (If yes 1 point)  []YES  []NO :Alternative diagnosis as likely or more likely than that of deep venous thrombosis (-2 if yes)    Wells DVT Score Criteria Below TOTAL =   -1    If Wells score is 2 or less, then d-dimer testing is recommended. The care is provided during an unprecedented national emergency due to the novel coronavirus, COVID-19.       ED Medications administered this visit:  Medications - No data to display    New Prescriptions from this visit:    Discharge Medication List as of 6/23/2022  4:53 PM      START taking these medications    Details   tiZANidine (ZANAFLEX) 4 MG tablet Take 1 tablet by mouth nightly as needed (spasms), Disp-10 tablet, R-0Print             Follow-up:  Ade Vang MD  [address] [address]  [facility], [address]  [address]  [address] [address]  [phone]    Call           Final Impression:   1. Left hip pain    2. Chronic pain of right knee    3.  Leg cramping               (Please note that portions of this note were completed with a voice recognition program )        Mary Kline. Radha 82, PA-C  06/23/22 2150

## 2022-06-23 NOTE — ED TRIAGE NOTES
Mode of arrival (squad #, walk in, police, etc) : walk in        Chief complaint(s): leg pain, hip pain        Arrival Note (brief scenario, treatment PTA, etc). : Pt reports bilateral leg pain and left hip pain. Pt reports right knee is burning and sharp pain in her left hip. Pt denies injury and states that standing makes the pain wor. se         C= \"Have you ever felt that you should Cut down on your drinking? \"  No  A= \"Have people Annoyed you by criticizing your drinking? \"  No  G= \"Have you ever felt bad or Guilty about your drinking? \"  No  E= \"Have you ever had a drink as an Eye-opener first thing in the morning to steady your nerves or to help a hangover? \"  No      Deferred []      Reason for deferring: N/A    *If yes to two or more: probable alcohol abuse. *

## 2022-06-23 NOTE — LETTER
[facility] ED  [address] [facility] [address]  Phone: [phone]             June 23, 2022    Patient: Jaay Hester   YOB: 1975   Date of Visit: 6/23/2022       To Whom It May Concern:    Larina Scheuermann was seen and treated in our emergency department on 6/23/2022. She may return to work on 6/24/2022.       Sincerely,             Signature:__________________________________

## 2022-06-24 NOTE — ED PROVIDER NOTES
eMERGENCY dEPARTMENT eNCOUnter   Independent Attestation     Pt Name: Yumiko Knapp  MRN: [id number]  [place] 1975  Date of evaluation: 6/23/22     Yumiko Knapp is a 52 y.o. female with CC: Leg Pain (bilateral) and Hip Pain (left)      Based on the medical record the care appears appropriate. I was personally available for consultation in the Emergency Department. The care is provided during an unprecedented national emergency due to the novel coronavirus, COVID 19.     Mami Penaloza MD  Attending Emergency Physician                    Mami Blanton MD  06/23/22 3033

## 2022-07-09 ENCOUNTER — HOSPITAL ENCOUNTER (EMERGENCY)
Age: 47
Discharge: HOME | End: 2022-07-10
Attending: EMERGENCY MEDICINE
Payer: COMMERCIAL

## 2022-07-09 VITALS
RESPIRATION RATE: 16 BRPM | BODY MASS INDEX: 39.48 KG/M2 | SYSTOLIC BLOOD PRESSURE: 154 MMHG | DIASTOLIC BLOOD PRESSURE: 102 MMHG | TEMPERATURE: 97.9 F | WEIGHT: 230 LBS | HEART RATE: 85 BPM | OXYGEN SATURATION: 99 %

## 2022-07-09 DIAGNOSIS — M76.61 ACHILLES TENDINITIS OF RIGHT LOWER EXTREMITY: ICD-10-CM

## 2022-07-09 DIAGNOSIS — R51.9 NONINTRACTABLE HEADACHE, UNSPECIFIED CHRONICITY PATTERN, UNSPECIFIED HEADACHE TYPE: Primary | ICD-10-CM

## 2022-07-09 LAB — HCG QUALITATIVE: NEGATIVE

## 2022-07-09 PROCEDURE — 99284 EMERGENCY DEPT VISIT MOD MDM: CPT

## 2022-07-09 PROCEDURE — 96375 TX/PRO/DX INJ NEW DRUG ADDON: CPT

## 2022-07-09 PROCEDURE — 6370000000 HC RX 637 (ALT 250 FOR IP): Performed by: STUDENT IN AN ORGANIZED HEALTH CARE EDUCATION/TRAINING PROGRAM

## 2022-07-09 PROCEDURE — 2580000003 HC RX 258: Performed by: STUDENT IN AN ORGANIZED HEALTH CARE EDUCATION/TRAINING PROGRAM

## 2022-07-09 PROCEDURE — 96365 THER/PROPH/DIAG IV INF INIT: CPT

## 2022-07-09 PROCEDURE — 6360000002 HC RX W HCPCS: Performed by: STUDENT IN AN ORGANIZED HEALTH CARE EDUCATION/TRAINING PROGRAM

## 2022-07-09 PROCEDURE — 84703 CHORIONIC GONADOTROPIN ASSAY: CPT

## 2022-07-09 PROCEDURE — 96372 THER/PROPH/DIAG INJ SC/IM: CPT

## 2022-07-09 RX ORDER — DEXAMETHASONE SODIUM PHOSPHATE 10 MG/ML
10 INJECTION INTRAMUSCULAR; INTRAVENOUS ONCE
Status: COMPLETED | OUTPATIENT
Start: 2022-07-10 | End: 2022-07-10

## 2022-07-09 RX ORDER — ACETAMINOPHEN 500 MG
1000 TABLET ORAL ONCE
Status: COMPLETED | OUTPATIENT
Start: 2022-07-09 | End: 2022-07-09

## 2022-07-09 RX ORDER — 0.9 % SODIUM CHLORIDE 0.9 %
1000 INTRAVENOUS SOLUTION INTRAVENOUS ONCE
Status: COMPLETED | OUTPATIENT
Start: 2022-07-09 | End: 2022-07-10

## 2022-07-09 RX ORDER — PROCHLORPERAZINE EDISYLATE 5 MG/ML
10 INJECTION INTRAMUSCULAR; INTRAVENOUS ONCE
Status: COMPLETED | OUTPATIENT
Start: 2022-07-09 | End: 2022-07-09

## 2022-07-09 RX ORDER — 0.9 % SODIUM CHLORIDE 0.9 %
1000 INTRAVENOUS SOLUTION INTRAVENOUS ONCE
Status: DISCONTINUED | OUTPATIENT
Start: 2022-07-09 | End: 2022-07-09

## 2022-07-09 RX ORDER — DIPHENHYDRAMINE HYDROCHLORIDE 50 MG/ML
50 INJECTION INTRAMUSCULAR; INTRAVENOUS ONCE
Status: DISCONTINUED | OUTPATIENT
Start: 2022-07-09 | End: 2022-07-09

## 2022-07-09 RX ORDER — PROCHLORPERAZINE EDISYLATE 5 MG/ML
10 INJECTION INTRAMUSCULAR; INTRAVENOUS ONCE
Status: DISCONTINUED | OUTPATIENT
Start: 2022-07-09 | End: 2022-07-09

## 2022-07-09 RX ORDER — MAGNESIUM SULFATE IN WATER 40 MG/ML
2000 INJECTION, SOLUTION INTRAVENOUS ONCE
Status: COMPLETED | OUTPATIENT
Start: 2022-07-09 | End: 2022-07-10

## 2022-07-09 RX ORDER — DIPHENHYDRAMINE HCL 25 MG
50 TABLET ORAL ONCE
Status: COMPLETED | OUTPATIENT
Start: 2022-07-09 | End: 2022-07-09

## 2022-07-09 RX ADMIN — DIPHENHYDRAMINE HCL 50 MG: 25 TABLET ORAL at 22:34

## 2022-07-09 RX ADMIN — ACETAMINOPHEN 1000 MG: 500 TABLET ORAL at 22:34

## 2022-07-09 RX ADMIN — PROCHLORPERAZINE EDISYLATE 10 MG: 5 INJECTION INTRAMUSCULAR; INTRAVENOUS at 22:34

## 2022-07-09 RX ADMIN — SODIUM CHLORIDE 1000 ML: 9 INJECTION, SOLUTION INTRAVENOUS at 23:37

## 2022-07-09 RX ADMIN — MAGNESIUM SULFATE HEPTAHYDRATE 2000 MG: 40 INJECTION, SOLUTION INTRAVENOUS at 23:38

## 2022-07-09 ASSESSMENT — PAIN - FUNCTIONAL ASSESSMENT: PAIN_FUNCTIONAL_ASSESSMENT: 0-10

## 2022-07-09 ASSESSMENT — PAIN SCALES - GENERAL
PAINLEVEL_OUTOF10: 5
PAINLEVEL_OUTOF10: 5

## 2022-07-10 ENCOUNTER — APPOINTMENT (OUTPATIENT)
Dept: GENERAL RADIOLOGY | Age: 47
End: 2022-07-10
Payer: COMMERCIAL

## 2022-07-10 VITALS
DIASTOLIC BLOOD PRESSURE: 81 MMHG | RESPIRATION RATE: 22 BRPM | TEMPERATURE: 98.4 F | HEART RATE: 93 BPM | OXYGEN SATURATION: 98 % | SYSTOLIC BLOOD PRESSURE: 138 MMHG

## 2022-07-10 DIAGNOSIS — M25.571 ACUTE RIGHT ANKLE PAIN: Primary | ICD-10-CM

## 2022-07-10 PROCEDURE — 73610 X-RAY EXAM OF ANKLE: CPT

## 2022-07-10 PROCEDURE — 6360000002 HC RX W HCPCS: Performed by: STUDENT IN AN ORGANIZED HEALTH CARE EDUCATION/TRAINING PROGRAM

## 2022-07-10 PROCEDURE — 6370000000 HC RX 637 (ALT 250 FOR IP): Performed by: STUDENT IN AN ORGANIZED HEALTH CARE EDUCATION/TRAINING PROGRAM

## 2022-07-10 RX ORDER — METHOCARBAMOL 500 MG/1
1500 TABLET, FILM COATED ORAL ONCE
Status: COMPLETED | OUTPATIENT
Start: 2022-07-10 | End: 2022-07-10

## 2022-07-10 RX ADMIN — METHOCARBAMOL 1500 MG: 500 TABLET ORAL at 17:49

## 2022-07-10 RX ADMIN — DEXAMETHASONE SODIUM PHOSPHATE 10 MG: 10 INJECTION INTRAMUSCULAR; INTRAVENOUS at 00:13

## 2022-07-10 ASSESSMENT — ENCOUNTER SYMPTOMS
VOMITING: 0
COUGH: 0
NAUSEA: 0
DIARRHEA: 0
ABDOMINAL PAIN: 0
SHORTNESS OF BREATH: 0
RHINORRHEA: 0
SHORTNESS OF BREATH: 0
COLOR CHANGE: 0
NAUSEA: 0
PHOTOPHOBIA: 1
ABDOMINAL PAIN: 0
VOMITING: 0

## 2022-07-10 ASSESSMENT — PAIN SCALES - GENERAL: PAINLEVEL_OUTOF10: 5

## 2022-07-10 ASSESSMENT — PAIN - FUNCTIONAL ASSESSMENT: PAIN_FUNCTIONAL_ASSESSMENT: 0-10

## 2022-07-10 NOTE — ED NOTES
Pt to ED coscmplaining of right ankle pain 2hrs PTA. Pt states she visited ED [facility] last night due to right ankle pain due to swelling started couple days now because she has a history of arthritis. Got discharged last night, Today when she was cooking at the kitchen she heard a snap on her right ankle and in pains her from pain scale 20/10 and was crying badly. Pt states she has a history of HTN and took her meds today. Pain states her pain scale right now is 5/10, Pt is alert and oriented x4. Call light w/in reach. Warm blanket provided. VS taken and recorded. Will cont plan of care.      Manuel Dias, BAILEY  07/10/22 6471

## 2022-07-10 NOTE — ED PROVIDER NOTES
[facility] ED  Emergency Department Encounter  EmergencyMedicine Resident     Pt Name:Marta Monroe  MRN: [id number]  [place] 1975  Date of evaluation: 7/10/22  PCP:  Juan Manuel Mcclendon MD      CHIEF COMPLAINT       Chief Complaint   Patient presents with    Ankle Pain     Right       HISTORY OF PRESENT ILLNESS  (Location/Symptom, Timing/Onset, Context/Setting, Quality, Duration, Modifying Factors, Severity.)      Kamini Pacheco is a 52 y.o. female who presents with complaint of right ankle pain after feeling a snap earlier today. Past medical history significant for osteoarthritis as well as hypertension. Patient states that she was here yesterday with right ankle pain. Concern for Achilles tendinitis. States that she was given a wrap and crutches and told to follow-up with orthopedic surgery. Today while she was in the kitchen she felt a snap in her right ankle and significant pain. Denies any numbness or tingling lower extremity. PAST MEDICAL / SURGICAL / SOCIAL / FAMILY HISTORY      has a past medical history of Dizzy spells, Hypertension, Low blood potassium, Mass, Migraine, Osteoarthritis, and Vitamin D deficiency. has a past surgical history that includes cyst removal (Left); [place] tooth extraction; other surgical history (Right, 04/15/2014); and Knee arthroscopy (Right, 1/31/2022).       Social History     Socioeconomic History    Marital status:      Spouse name: Not on file    Number of children: Not on file    Years of education: Not on file    Highest education level: Not on file   Occupational History    Not on file   Tobacco Use    Smoking status: Never Smoker    Smokeless tobacco: Never Used   Vaping Use    Vaping Use: Never used   Substance and Sexual Activity    Alcohol use: No    Drug use: No    Sexual activity: Not on file   Other Topics Concern    Not on file   Social History Narrative    Not on file     Social Determinants of Health Financial Resource Strain:     Difficulty of Paying Living Expenses: Not on file   Food Insecurity:     Worried About Running Out of Food in the Last Year: Not on file    Kita of Food in the Last Year: Not on file   Transportation Needs:     Lack of Transportation (Medical): Not on file    Lack of Transportation (Non-Medical): Not on file   Physical Activity:     Days of Exercise per Week: Not on file    Minutes of Exercise per Session: Not on file   Stress:     Feeling of Stress : Not on file   Social Connections:     Frequency of Communication with Friends and Family: Not on file    Frequency of Social Gatherings with Friends and Family: Not on file    Attends [employer] Services: Not on file    Active Member of [address] [place] [address] [employer] or Organizations: Not on file    Attends Club or Organization Meetings: Not on file    Marital Status: Not on file   Intimate Partner Violence:     Fear of Current or Ex-Partner: Not on file    Emotionally Abused: Not on file    Physically Abused: Not on file    Sexually Abused: Not on file   Housing Stability:     Unable to Pay for Housing in the Last Year: Not on file    Number of [place] in the Last Year: Not on file    Unstable Housing in the Last Year: Not on file       Family History   Problem Relation Age of Onset    High Blood Pressure Mother     Arthritis Mother     Asthma Mother     Heart Disease Father     High Blood Pressure Brother        Allergies:  Aspirin, Codeine, Motrin [ibuprofen], and Naprosyn [naproxen]    Home Medications:  Prior to Admission medications    Medication Sig Start Date End Date Taking?  Authorizing Provider   tiZANidine (ZANAFLEX) 4 MG tablet Take 1 tablet by mouth nightly as needed (spasms) 6/23/22   Tricia Sanchez PA-C   verapamil (CALAN) 120 MG tablet Take 120 mg by mouth daily    Historical Provider, MD   predniSONE (DELTASONE) 10 MG tablet Take 10 mg by mouth daily    Historical Provider, MD   rizatriptan (MAXALT) 10 MG tablet Take 10 mg by mouth once as needed for Migraine May repeat in 2 hours if needed    Historical Provider, MD   famotidine (PEPCID) 20 MG tablet Take 20 mg by mouth daily    Historical Provider, MD   Acetaminophen (TYLENOL ARTHRITIS PAIN PO) Take 650 mg by mouth as needed    Historical Provider, MD   albuterol sulfate  (90 Base) MCG/ACT inhaler Inhale 2 puffs into the lungs 4 times daily as needed for Wheezing 7/2/19   Vida Hill MD   amitriptyline (ELAVIL) 25 MG tablet TAKE 1 TABLET BY MOUTH EVERY NIGHT 4/23/19   Libby Abraham MD   SUMAtriptan (IMITREX) 50 MG tablet Take 1 tablet by mouth once as needed for Migraine Do not takes More than 2 pills a day 3/28/19 3/28/19  Libby Abraham MD   pantoprazole (PROTONIX) 40 MG tablet Take 1 tablet by mouth every morning (before breakfast) 3/28/19   Libby Abraham MD   losartan-hydrochlorothiazide (HYZAAR) 100-12.5 MG per tablet Take 1 tablet by mouth daily 3/20/19   Remi Randle MD       REVIEW OF SYSTEMS    (2-9 systems for level 4, 10 or more for level 5)      Review of Systems   Constitutional: Negative for chills and fever. HENT: Negative for congestion and rhinorrhea. Respiratory: Negative for shortness of breath. Cardiovascular: Negative for chest pain. Gastrointestinal: Negative for abdominal pain, diarrhea, nausea and vomiting. Musculoskeletal: Negative for joint swelling. Right ankle pain   Skin: Negative for rash and wound. Neurological: Negative for weakness, numbness and headaches. PHYSICAL EXAM   (up to 7 for level 4, 8 or more for level 5)      INITIAL VITALS:   /81   Pulse 93   Temp 98.4 °F (36.9 °C)   Resp 22   SpO2 98%     Physical Exam  Constitutional:       General: She is not in acute distress. Appearance: She is not ill-appearing, toxic-appearing or diaphoretic. Cardiovascular:      Rate and Rhythm: Normal rate and regular rhythm. Heart sounds: No murmur heard. No friction rub.  No gallop. Pulmonary:      Effort: No respiratory distress. Breath sounds: No stridor. No wheezing, rhonchi or rales. Chest:      Chest wall: No tenderness. Abdominal:      General: There is no distension. Palpations: There is no mass. Tenderness: There is no abdominal tenderness. There is no guarding or rebound. Hernia: No hernia is present. Musculoskeletal:         General: Tenderness present. No swelling, deformity or signs of injury. Comments: Right Achilles tendon tender to palpation, bilateral ankles symmetrical, right Achilles as well as left Achilles tendon symmetrical on palpation. 2+ peripheral pulses bilateral lower extremities. Sensation intact in bilateral lower extremities. Full range of motion of the ankle. Strength intact lower extremities. Skin:     General: Skin is warm and dry. Capillary Refill: Capillary refill takes less than 2 seconds. Coloration: Skin is not jaundiced or pale. Findings: No bruising, erythema, lesion or rash. Neurological:      Mental Status: She is oriented to person, place, and time. DIFFERENTIAL  DIAGNOSIS     PLAN (LABS / IMAGING / EKG):  Orders Placed This Encounter   Procedures    XR ANKLE RIGHT (MIN 3 VIEWS)    ADAPTPretty in my Pocket (PRIMP) ORTHOPEDIC SUPPLIES Walker Boot, Air Select Low Top, Right; MD (M7-10/F8-11)       MEDICATIONS ORDERED:  Orders Placed This Encounter   Medications    methocarbamol (ROBAXIN) tablet 1,500 mg       DDX: Ankle strain/sprain, partial Achilles tendon tear, Achilles tendon rupture, fracture gout, septic arthritis    DIAGNOSTIC RESULTS / EMERGENCY DEPARTMENT COURSE / MDM   LAB RESULTS:  No results found for this visit on 07/10/22. IMPRESSION: N/A    RADIOLOGY:  XR ANKLE RIGHT (MIN 3 VIEWS)    Result Date: 7/10/2022  EXAMINATION: THREE XRAY VIEWS OF THE RIGHT ANKLE 7/10/2022 5:31 pm COMPARISON: None.  HISTORY: ORDERING SYSTEM PROVIDED HISTORY: R ankle pain, felt snap TECHNOLOGIST PROVIDED HISTORY: R ankle pain, felt snap FINDINGS: No acute fracture is seen. No evidence of dislocation. The ankle mortise is congruent. There are plantar and retrocalcaneal enthesophytes. No acute bony abnormality identified in the right ankle. EMERGENCY DEPARTMENT COURSE:  59-year-old female present with chief complaint of right ankle pain. Recently seen here yesterday. Tenderness over the Achilles tendon was diagnosed with Achilles tendinitis yesterday. Felt a snap today. Achilles feels intact. Bedside ultrasound shows intact Achilles tendon. Forage motion of the right ankle. No overlying warmth erythema of the right ankle. Afebrile emergency department. Low suspicion for gout or septic arthritis. X-ray negative for acute fracture. Patient given walking boot. Instructed to follow-up with orthopedic surgery with concerns for partial Achilles tendon tear. Patient amenable to discharge voiced understanding strict return precautions. PROCEDURES:  n/a    CONSULTS:  None    CRITICAL CARE:  n/a         FINAL IMPRESSION      1.  Acute right ankle pain          DISPOSITION / PLAN     DISPOSITION Decision To Discharge 07/10/2022 05:51:05 PM      PATIENT REFERRED TO:  Beatriz Friend MD  [id number]  [phone]    Schedule an appointment as soon as possible for a visit        [address] [address] [phone]  Schedule an appointment as soon as possible for a visit         DISCHARGE MEDICATIONS:  Discharge Medication List as of 7/10/2022  5:52 PM          Verónica Melo DO  Emergency Medicine Resident    (Please note that portions of thisnote were completed with a voice recognition program.  Efforts were made to edit the dictations but occasionally words are mis-transcribed.)        Apple Goldstein DO  Resident  07/10/22 [id number]

## 2022-07-10 NOTE — ED NOTES
Pt presents to the ED with c/o leg and ankle swelling, pt states that it is mainly the RLE and that it hurts to walk in. Pt ambulatory to stretcher. Pt denies use of blood thinners, pt denies hx of blood clots. RR even and unlabored. A&Ox4. No distress noted. Call light within reach.      Berlin Curtis RN  07/09/22 [id number]

## 2022-07-10 NOTE — ED PROVIDER NOTES
[facility] ED     Emergency Department     Faculty Attestation        I performed a history and physical examination of the patient and discussed management with the resident. I reviewed the residents note and agree with the documented findings and plan of care. Any areas of disagreement are noted on the chart. I was personally present for the key portions of any procedures. I have documented in the chart those procedures where I was not present during the key portions. I have reviewed the emergency nurses triage note. I agree with the chief complaint, past medical history, past surgical history, allergies, medications, social and family history as documented unless otherwise noted below. For Physician Assistant/ Nurse Practitioner cases/documentation I have personally evaluated this patient and have completed at least one if not all key elements of the E/M (history, physical exam, and MDM). Additional findings are as noted. Vital Signs: BP: (!) 162/97  Heart Rate: 93  Resp: 22  Temp: 98.4 °F (36.9 °C) SpO2: 96 %  PCP:  Rolan Acharya MD    Pertinent Comments:         Critical Care  None    This patient was evaluated in the Emergency Department for symptoms described in the history of present illness. He/she was evaluated in the context of the global COVID-19 pandemic, which necessitated consideration that the patient might be at risk for infection with the SARS-CoV-2 virus that causes COVID-19. Institutional protocols and algorithms that pertain to the evaluation of patients at risk for COVID-19 are in a state of rapid change based on information released by regulatory bodies including the CDC and federal and state organizations. These policies and algorithms were followed during the patient's care in the ED.     (Please note that portions of this note were completed with a voice recognition program. Efforts were made to edit the dictations but occasionally words are mis-transcribed.  Whenever words are used in this note in any gender, they shall be construed as though they were used in the gender appropriate to the circumstances; and whenever words are used in this note in the singular or plural form, they shall be construed as though they were used in the form appropriate to the circumstances.)    MD Pat Castro  Attending Emergency Medicine Physician           Harshad Ojeda MD  07/10/22 [id number]

## 2022-07-10 NOTE — ED PROVIDER NOTES
[facility]     Emergency Department     Faculty Attestation    I performed a history and physical examination of the patient and discussed management with the resident. I reviewed the resident´s note and agree with the documented findings and plan of care. Any areas of disagreement are noted on the chart. I was personally present for the key portions of any procedures. I have documented in the chart those procedures where I was not present during the key portions. I have reviewed the emergency nurses triage note. I agree with the chief complaint, past medical history, past surgical history, allergies, medications, social and family history as documented unless otherwise noted below. For Physician Assistant/ Nurse Practitioner cases/documentation I have personally evaluated this patient and have completed at least one if not all key elements of the E/M (history, physical exam, and MDM). Additional findings are as noted. Pain over right Achilles tendon, tendon intact, no signs of DVT, no bony pain.   Typical migraine for her, last 1 like this was 1 month ago, no meningeal signs, neuro intact, no ataxia nystagmus, cerebellar testing normal.     Lokesh Hathaway MD  07/09/22 [id number]

## 2022-07-10 NOTE — ED PROVIDER NOTES
[facility]  ED  Emergency Department Encounter  Emergency Medicine Resident     Pt Name: Sherlyn Bass  MRN: [id number]  [place] 1975  Date of evaluation: 7/10/22  PCP:  Christopher Ott MD    CHIEF COMPLAINT       Chief Complaint   Patient presents with    Leg Swelling       HISTORY OFPRESENT ILLNESS  (Location/Symptom, Timing/Onset, Context/Setting, Quality, Duration, Modifying Paula Clock.)      Sherlyn Bass is a 52 y.o. female who presents with complaints of 2 days of right lower extremity pain. Patient localizes the pain at her posterior ankles along the Achilles tendon insertion. No injury or trauma to the area. Patient does report she is on her feet all day for work. Pain has been progressively worsening. Patient states that it hurts to walk but she has been able to bear weight. No history of DVT. Patient also reports she has had 2 days of frontal/superior headache which is consistent with her typical migraines. She had been taking Tylenol at home which has not been helping her symptoms. No neck pain or stiffness, no fevers. No nausea or vomiting. PAST MEDICAL / SURGICAL / SOCIAL / FAMILY HISTORY      has a past medical history of Dizzy spells, Hypertension, Low blood potassium, Mass, Migraine, Osteoarthritis, and Vitamin D deficiency. has a past surgical history that includes cyst removal (Left); Ma[place]din tooth extraction; other surgical history (Right, 04/15/2014); and Knee arthroscopy (Right, 1/31/2022).     Social History     Socioeconomic History    Marital status:      Spouse name: Not on file    Number of children: Not on file    Years of education: Not on file    Highest education level: Not on file   Occupational History    Not on file   Tobacco Use    Smoking status: Never Smoker    Smokeless tobacco: Never Used   Vaping Use    Vaping Use: Never used   Substance and Sexual Activity    Alcohol use: No    Drug use: No    Sexual activity: Not on file   Other Topics Concern    Not on file   Social History Narrative    Not on file     Social Determinants of Health     Financial Resource Strain:     Difficulty of Paying Living Expenses: Not on file   Food Insecurity:     Worried About Running Out of Food in the Last Year: Not on file    [address] of Food in the Last Year: Not on file   Transportation Needs:     Lack of Transportation (Medical): Not on file    Lack of Transportation (Non-Medical): Not on file   Physical Activity:     Days of Exercise per Week: Not on file    Minutes of Exercise per Session: Not on file   Stress:     Feeling of Stress : Not on file   Social Connections:     Frequency of Communication with Friends and Family: Not on file    Frequency of Social Gatherings with Friends and Family: Not on file    Attends Restorationist Services: Not on file    Active Member of [address] [place] [address] [employer] or Organizations: Not on file    Attends Club or Organization Meetings: Not on file    Marital Status: Not on file   Intimate Partner Violence:     Fear of Current or Ex-Partner: Not on file    Emotionally Abused: Not on file    Physically Abused: Not on file    Sexually Abused: Not on file   Housing Stability:     Unable to Pay for Housing in the Last Year: Not on file    Number of [place] in the Last Year: Not on file    Unstable Housing in the Last Year: Not on file       Family History   Problem Relation Age of Onset    High Blood Pressure Mother     Arthritis Mother     Asthma Mother     Heart Disease Father     High Blood Pressure Brother        Allergies:  Aspirin, Codeine, Motrin [ibuprofen], and Naprosyn [naproxen]    Home Medications:  Prior to Admission medications    Medication Sig Start Date End Date Taking?  Authorizing Provider   tiZANidine (ZANAFLEX) 4 MG tablet Take 1 tablet by mouth nightly as needed (spasms) 6/23/22   Matthias Martinez PA-C   verapamil (CALAN) 120 MG tablet Take 120 mg by mouth daily Historical Provider, MD   predniSONE (DELTASONE) 10 MG tablet Take 10 mg by mouth daily    Historical Provider, MD   rizatriptan (MAXALT) 10 MG tablet Take 10 mg by mouth once as needed for Migraine May repeat in 2 hours if needed    Historical Provider, MD   famotidine (PEPCID) 20 MG tablet Take 20 mg by mouth daily    Historical Provider, MD   Acetaminophen (TYLENOL ARTHRITIS PAIN PO) Take 650 mg by mouth as needed    Historical Provider, MD   albuterol sulfate  (90 Base) MCG/ACT inhaler Inhale 2 puffs into the lungs 4 times daily as needed for Wheezing 7/2/19   Lyn Silverman MD   amitriptyline (ELAVIL) 25 MG tablet TAKE 1 TABLET BY MOUTH EVERY NIGHT 4/23/19   Kiera Moon, MD   SUMAtriptan (IMITREX) 50 MG tablet Take 1 tablet by mouth once as needed for Migraine Do not takes More than 2 pills a day 3/28/19 3/28/19  Kiera Moon, MD   pantoprazole (PROTONIX) 40 MG tablet Take 1 tablet by mouth every morning (before breakfast) 3/28/19   Kiera oMon, MD   losartan-hydrochlorothiazide (HYZAAR) 100-12.5 MG per tablet Take 1 tablet by mouth daily 3/20/19   Lester Ardon MD       REVIEW OF SYSTEMS    (2-9 systems for level 4, 10 or more for level 5)      Review of Systems   Constitutional: Negative for fever. HENT: Negative for congestion. Eyes: Positive for photophobia. Respiratory: Negative for cough and shortness of breath. Cardiovascular: Negative for chest pain. Gastrointestinal: Negative for abdominal pain, nausea and vomiting. Musculoskeletal: Positive for gait problem and myalgias. Negative for neck pain and neck stiffness. Skin: Negative for color change. Neurological: Positive for headaches. Negative for weakness and numbness. Psychiatric/Behavioral: Negative for confusion. PHYSICAL EXAM   (up to 7 for level 4, 8 or more for level 5)     INITIAL VITALS:    weight is 230 lb (104.3 kg). Her oral temperature is 97.9 °F (36.6 °C).  Her blood pressure is 154/102 (abnormal) and her pulse is 85. Her respiration is 16 and oxygen saturation is 99%. Physical Exam  Constitutional:       General: She is not in acute distress. Appearance: Normal appearance. She is not ill-appearing or toxic-appearing. HENT:      Mouth/Throat:      Pharynx: No oropharyngeal exudate. Eyes:      Extraocular Movements: Extraocular movements intact. Pupils: Pupils are equal, round, and reactive to light. Cardiovascular:      Rate and Rhythm: Normal rate and regular rhythm. Pulses: Normal pulses. Heart sounds: No murmur heard. Pulmonary:      Effort: Pulmonary effort is normal. No respiratory distress. Breath sounds: Normal breath sounds. No wheezing. Abdominal:      General: Abdomen is flat. There is no distension. Palpations: Abdomen is soft. Tenderness: There is no abdominal tenderness. There is no guarding. Musculoskeletal:         General: No tenderness. Right lower leg: No edema. Left lower leg: No edema. Comments: Patient does have pain to palpation over her Achilles tendon on the right lower extremity. Negative Amin's test.  Bilateral lower extremities are neurovascular intact   Skin:     General: Skin is warm and dry. Capillary Refill: Capillary refill takes less than 2 seconds. Findings: No rash. Neurological:      Mental Status: She is alert. Comments: Cranial nerves II-XII are equal and grossly intact with pupils equal and reactive with bilateral pupillary reflexes intact, no visual field deficits, intact extraocular motion, no facial motor deficit or droop, no facial sensory deficit, symmetrical palate elevation, intact tongue range of motion, good shoulder shrug and good neck range of motion, no finger-nose ataxia, no pronator drift, good hand grasp bilaterally, 5/5 strength in the bilateral upper and lower extremity, no sensory deficit in the upper or lower extremity.      Psychiatric:         Mood and Affect: Mood normal.         Behavior: Behavior normal.         DIFFERENTIAL  DIAGNOSIS     PLAN (LABS / IMAGING / EKG):  Orders Placed This Encounter   Procedures    HCG Qualitative, Serum    Apply ace wrap    [employer] Crutches; Pair, Right Side Injury; Med (5'2\"-5'10\")       MEDICATIONS ORDERED:  Orders Placed This Encounter   Medications    DISCONTD: prochlorperazine (COMPAZINE) injection 10 mg    DISCONTD: diphenhydrAMINE (BENADRYL) injection 50 mg    DISCONTD: 0.9 % sodium chloride bolus    diphenhydrAMINE (BENADRYL) tablet 50 mg    prochlorperazine (COMPAZINE) injection 10 mg    acetaminophen (TYLENOL) tablet 1,000 mg    0.9 % sodium chloride bolus    magnesium sulfate 2000 mg in 50 mL IVPB premix    dexamethasone (DECADRON) injection 10 mg       DDX: Acute on chronic migraine, Achilles tendinitis, tendinopathy, musculoskeletal sprain/strain, plantar fasciitis    Initial MDM/Plan: 52 y.o. female who presents with several days of right lower extremity pain as well as 1 day of headache which is consistent with prior because of migraine. Seen and examined, no acute distress, is unremarkable. Patient is well-appearing, speaking in full sentences, not ill nontoxic in appearance. She is resting with the lights darkened in her room. Physical lamination demonstrates pain to palpation over the patient's right Achilles tendon. Negative Amin's test.  Bilateral lower extremity rest intact. No signs of unilateral leg swelling or edema, no physical exam findings concerning for DVT. Given location of patient's pain most likely Achilles tendinopathy. Will place Ace wrap, give crutches and give orthopedic follow-up. Patient does have migraine consistent with episodes of migraine, no red flag symptoms. Will treat symptoms with migraine cocktail.     DIAGNOSTIC RESULTS / EMERGENCY DEPARTMENT COURSE / MDM     LABS:  Labs Reviewed   HCG, SERUM, QUALITATIVE         RADIOLOGY:  No results found.      EMERGENCY DEPARTMENT COURSE:  ED Course as of 07/10/22 0629   Sat Jul 09, 2022   [age]-year-old female presents with complaints of 2 days of right lower extremity pain. Patient localizes the pain at her posterior ankles along the Achilles tendon insertion. No injury or trauma to the area. Patient does report she is on her feet all day for work. Pain has been progressively worsening. Patient states that it hurts to walk but she has been able to bear weight. No history of DVT. Patient also reports she has had 2 days of frontal/superior headache which is consistent with her typical migraines. She had been taking Tylenol at home which has not been helping her symptoms. No neck pain or stiffness, no fevers. No nausea or vomiting. [DS]      ED Course User Index  [DS] Turner Leigh DO     Given Compazine, Benadryl, Tylenol reassessed. Reported no improvement. Given IV fluids, magnesium, pregnancy test was sent, given Decadron as well. Reported improvement in her symptoms, feels she can manage her symptoms at home. Okay for discharge at this time, return precautions were discussed. PROCEDURES:  None    CONSULTS:  None    CRITICAL CARE:  Please see attending note    FINAL IMPRESSION      1. Nonintractable headache, unspecified chronicity pattern, unspecified headache type    2.  Achilles tendinitis of right lower extremity          DISPOSITION / PLAN     DISPOSITION Decision To Discharge 07/10/2022 12:47:51 AM        PATIENTREFERRED TO:  [address]  [address] [address]  [phone]  Schedule an appointment as soon as possible for a visit   eval right achilles tendon      DISCHARGE MEDICATIONS:  Discharge Medication List as of 7/10/2022 12:51 AM          Turner Leigh DO  EmergencyMedicine Resident    (Please note that portions of this note were completed with a voice recognition program.  Efforts were made to edit the dictations but occasionally words are mis-transcribed.)        Julianne Jacob DO  Resident  07/10/22 [id number]

## 2022-07-10 NOTE — ED NOTES
Ace wrap applied to rt ankle. Crutch education provided to pt. Pt provided return demonstration.       Marco A Rice RN  07/10/22 7039

## 2022-07-20 ENCOUNTER — OFFICE VISIT (OUTPATIENT)
Dept: ORTHOPEDIC SURGERY | Age: 47
End: 2022-07-20

## 2022-07-20 DIAGNOSIS — M25.561 RIGHT KNEE PAIN, UNSPECIFIED CHRONICITY: Primary | ICD-10-CM

## 2022-07-20 DIAGNOSIS — M25.571 RIGHT ANKLE PAIN, UNSPECIFIED CHRONICITY: Primary | ICD-10-CM

## 2022-07-20 PROCEDURE — 99213 OFFICE O/P EST LOW 20 MIN: CPT | Performed by: ORTHOPAEDIC SURGERY

## 2022-07-20 PROCEDURE — 20610 DRAIN/INJ JOINT/BURSA W/O US: CPT | Performed by: ORTHOPAEDIC SURGERY

## 2022-07-20 RX ORDER — LIDOCAINE HYDROCHLORIDE 10 MG/ML
2 INJECTION, SOLUTION INFILTRATION; PERINEURAL ONCE
Status: COMPLETED | OUTPATIENT
Start: 2022-07-20 | End: 2022-07-20

## 2022-07-20 RX ORDER — BETAMETHASONE SODIUM PHOSPHATE AND BETAMETHASONE ACETATE 3; 3 MG/ML; MG/ML
12 INJECTION, SUSPENSION INTRA-ARTICULAR; INTRALESIONAL; INTRAMUSCULAR; SOFT TISSUE ONCE
Status: COMPLETED | OUTPATIENT
Start: 2022-07-20 | End: 2022-07-20

## 2022-07-20 RX ADMIN — BETAMETHASONE SODIUM PHOSPHATE AND BETAMETHASONE ACETATE 12 MG: 3; 3 INJECTION, SUSPENSION INTRA-ARTICULAR; INTRALESIONAL; INTRAMUSCULAR; SOFT TISSUE at 08:55

## 2022-07-20 RX ADMIN — LIDOCAINE HYDROCHLORIDE 2 ML: 10 INJECTION, SOLUTION INFILTRATION; PERINEURAL at 08:57

## 2022-07-20 SDOH — HEALTH STABILITY: PHYSICAL HEALTH: ON AVERAGE, HOW MANY DAYS PER WEEK DO YOU ENGAGE IN MODERATE TO STRENUOUS EXERCISE (LIKE A BRISK WALK)?: 7 DAYS

## 2022-07-20 SDOH — HEALTH STABILITY: PHYSICAL HEALTH: ON AVERAGE, HOW MANY MINUTES DO YOU ENGAGE IN EXERCISE AT THIS LEVEL?: PATIENT DECLINED

## 2022-07-20 NOTE — PROGRESS NOTES
[address] M.D.            [facility] [address] [facility],[address], [address] [facility]           Dept Phone: [phone]           Dept Fax:  [address]           [place] Rodrigues          Dept Phone: [phone]           Dept Fax:  [phone]      Chief Compliant:  Chief Complaint   Patient presents with    Knee Pain     right        History of Present Illness: This is a 52 y.o. female who presents to the clinic today for evaluation / follow up of right knee pain. Patient 59-year-old female who had a previous arthroscopy partial medial side of her right knee this past January. She states that was doing well up until the last month or so. She is wearing a cam walker now she has some tendon injuries in her ankle. She denies any recurrent injury or trauma. She describes pain and swelling. Review of Systems   Constitutional: Negative for fever, chills, sweats. Eyes: Negative for changes in vision, or pain. HENT: Negative for ear ache, epistaxis, or sore throat. Respiratory/Cardio: Negative for Chest pain, palpitations, SOB, or cough. Gastrointestinal: Negative for abdominal pain, N/V/D. Genitourinary: Negative for dysuria, frequency, urgency, or hematuria. Neurological: Negative for headache, numbness, or weakness. Integumentary: Negative for rash, itching, laceration, or abrasion. Musculoskeletal: Positive for Knee Pain (right)       Physical Exam:  Constitutional: Patient is oriented to person, place, and time. Patient appears well-developed and well nourished. HENT: Negative otherwise noted  Head: Normocephalic and Atraumatic  Nose: Normal  Eyes: Conjunctivae and EOM are normal  Neck: Normal range of motion Neck supple. Respiratory/Cardio: Effort normal. No respiratory distress.   Musculoskeletal: Examination right knee notes a modest effusion. Her knee motion is 0 to 130 degrees Thu's is negative ligamentously she is grossly intact some patellofemoral pain compression but no apprehension. No hip rotational pain    Neurological: Patient is alert and oriented to person, place, and time. Normal strenght. No sensory deficit. Skin: Skin is warm and dry  Psychiatric: Behavior is normal. Thought content normal.  Nursing note and vitals reviewed. Labs and Imaging:     XR taken today:  XR KNEE BILATERAL STANDING    Result Date: 7/20/2022  X-rays taken today reviewed by me show standing AP of both knees and lateral the right knee. Patient has fairly moderate medial joint space narrowing for her age no acute process is noted on either side          Orders Placed This Encounter   Procedures    XR KNEE BILATERAL STANDING     Standing Status:   Future     Number of Occurrences:   1     Standing Expiration Date:   7/19/2023 20610 - DRAIN/INJECT LARGE JOINT BURSA       Assessment and Plan: Moderate DJD right knee with previous arthroscopic partial medial meniscectomy    Administrations This Visit       betamethasone acetate-betamethasone sodium phosphate (CELESTONE) injection 12 mg       Admin Date  07/20/2022  08:55 Action  Given Dose  12 mg Route  Intra-artICUlar Site  Knee Right Administered By  Wilmar Wells LPN    Ordering Provider: Sabino Nielsen MD    NDC: 04279-037-07    Lot#: [id number]    : Alvaro Bobo    Patient Supplied?: No                    This is a 52 y.o. female who presents to the clinic today for evaluation / follow up of right knee pain with effusion.      Past History:    Current Outpatient Medications:     tiZANidine (ZANAFLEX) 4 MG tablet, Take 1 tablet by mouth nightly as needed (spasms), Disp: 10 tablet, Rfl: 0    verapamil (CALAN) 120 MG tablet, Take 120 mg by mouth daily, Disp: , Rfl:     predniSONE (DELTASONE) 10 MG tablet, Take 10 mg by mouth daily, Disp: , Rfl:     rizatriptan (MAXALT) 10 MG tablet, Take 10 mg by mouth once as needed for Migraine May repeat in 2 hours if needed, Disp: , Rfl:     famotidine (PEPCID) 20 MG tablet, Take 20 mg by mouth daily, Disp: , Rfl:     Acetaminophen (TYLENOL ARTHRITIS PAIN PO), Take 650 mg by mouth as needed, Disp: , Rfl:     albuterol sulfate  (90 Base) MCG/ACT inhaler, Inhale 2 puffs into the lungs 4 times daily as needed for Wheezing, Disp: 1 Inhaler, Rfl: 0    amitriptyline (ELAVIL) 25 MG tablet, TAKE 1 TABLET BY MOUTH EVERY NIGHT, Disp: 30 tablet, Rfl: 0    SUMAtriptan (IMITREX) 50 MG tablet, Take 1 tablet by mouth once as needed for Migraine Do not takes More than 2 pills a day, Disp: 9 tablet, Rfl: 3    pantoprazole (PROTONIX) 40 MG tablet, Take 1 tablet by mouth every morning (before breakfast), Disp: 90 tablet, Rfl: 1    losartan-hydrochlorothiazide (HYZAAR) 100-12.5 MG per tablet, Take 1 tablet by mouth daily, Disp: 30 tablet, Rfl: 0  Allergies   Allergen Reactions    Aspirin Other (See Comments)     Mouth breaks out    Codeine Other (See Comments)     Patient has bad stomach pain    Motrin [Ibuprofen] Other (See Comments)     Mouth breaks out    Naprosyn [Naproxen] Other (See Comments)     Unsure of reaction     Social History     Socioeconomic History    Marital status:      Spouse name: Not on file    Number of children: Not on file    Years of education: Not on file    Highest education level: Not on file   Occupational History    Not on file   Tobacco Use    Smoking status: Never    Smokeless tobacco: Never   Vaping Use    Vaping Use: Never used   Substance and Sexual Activity    Alcohol use: No    Drug use: No    Sexual activity: Not on file   Other Topics Concern    Not on file   Social History Narrative    Not on file     Social Determinants of Health     Financial Resource Strain: Not on file   Food Insecurity: Not on file   Transportation Needs: Not on file   Physical Activity: Not on file   Stress: Not on file   Social Connections: Not on file   Intimate Partner Violence: Not on file   Housing Stability: Not on file     Past Medical History:   Diagnosis Date    Dizzy spells     Hypertension     Low blood potassium     Mass     rt. thigh    Migraine     Osteoarthritis     hip    Vitamin D deficiency      Past Surgical History:   Procedure Laterality Date    CYST REMOVAL Left     ring finger    KNEE ARTHROSCOPY Right 1/31/2022    KNEE ARTHROSCOPY PARTIAL MEDIAL MENISCECTOMY performed by Halima Ramirez MD at [address] Right 04/15/2014    removal of lesions X4    WISDOM TOOTH EXTRACTION       Family History   Problem Relation Age of Onset    High Blood Pressure Mother     Arthritis Mother     Asthma Mother     Heart Disease Father     High Blood Pressure Brother    Plan  An informed verbal consent for the procedure was obtained and risks including, but not limited to: allergy to medications, injection, bleeding, stiffness of joint, recurrence of symptoms, loss of function, swelling, drainage, irrigation, need for surgery and pseudo-septic inflammation, were explained to the patient. Also, discussed was the potential for further injections, irrigation and debridement and surgery. Alternate means of treatment have also been discussed with the patient.       Administrations This Visit       betamethasone acetate-betamethasone sodium phosphate (CELESTONE) injection 12 mg       Admin Date  07/20/2022  08:55 Action  Given Dose  12 mg Route  Intra-artICUlar Site  Knee Right Administered By  Elo Smith LPN    Ordering Provider: Halima Ramirez MD    NDC: 28655-131-66    Lot#: [id number]    : Matthew Morataya    Patient Supplied?: No              lidocaine 1 % injection 2 mL       Admin Date  07/20/2022  08:57 Action  Given Dose  2 mL Route  Intra-artICUlar Site  Knee Right Administered By  Elo Smith LPN    Ordering Provider: Halima Ramirez MD    [address]: 79771-136-42    Lot#: [id number]    : Onur Tello [place]    Patient Supplied?: No                Sterile conditions patient's right knee was injected with lidocaine 2 cc and then aspirated approximately 7 cc of fluid. She then got injected with 2 cc of Celestone. She Toller procedure well    Patient was advised that she does have fairly moderate arthritis for her right knee and is likely to give her problems down the road. Also she does not steroids like likely be a good candidate to be started on a long-term anti-inflammatory as well. Provider Attestation:  Howie Lindsay, personally performed the services described in this documentation. All medical record entries made by the scribe were at my direction and in my presence. I have reviewed the chart and discharge instructions and agree that the records reflect my personal performance and is accurate and complete. Salina Medrano MD. 07/20/22      Please note that this chart was generated using voice recognition Dragon dictation software. Although every effort was made to ensure the accuracy of this automated transcription, some errors in transcription may have occurred.

## 2022-07-21 ENCOUNTER — OFFICE VISIT (OUTPATIENT)
Dept: ORTHOPEDIC SURGERY | Age: 47
End: 2022-07-21
Payer: COMMERCIAL

## 2022-07-21 VITALS — WEIGHT: 230 LBS | RESPIRATION RATE: 16 BRPM | OXYGEN SATURATION: 100 % | HEIGHT: 64 IN | BODY MASS INDEX: 39.27 KG/M2

## 2022-07-21 DIAGNOSIS — S86.011A RUPTURE OF RIGHT ACHILLES TENDON, INITIAL ENCOUNTER: Primary | ICD-10-CM

## 2022-07-21 PROCEDURE — 99203 OFFICE O/P NEW LOW 30 MIN: CPT | Performed by: ORTHOPAEDIC SURGERY

## 2022-07-21 NOTE — PROGRESS NOTES
[address] [place] [address] AND SPORTS MEDICINE  [facility] Noemi Mae  [address] [address]  Dept: [phone]    Ambulatory Orthopedic Consult      CHIEF COMPLAINT:    Chief Complaint   Patient presents with    Ankle Pain     Right       HISTORY OF PRESENT ILLNESS:      The patient is a 52 y.o. female who is being seen for evaluation of the above, which began around 7/13/22 relatively atraumatically. At today's visit, she is using a brace/boot. History is obtained today from:   [x]  the patient     [x]  EMR     []  one family member/friend    []  multiple family members/friends    []  other:      She reports feelign a pop in the back of her heel. REVIEW OF SYSTEMS:  Musculoskeletal: See HPI for pertinent positives     Past Medical History:    She  has a past medical history of Dizzy spells, Hypertension, Low blood potassium, Mass, Migraine, Osteoarthritis, and Vitamin D deficiency. Past Surgical History:    She  has a past surgical history that includes cyst removal (Left); [place] tooth extraction; other surgical history (Right, 04/15/2014); and Knee arthroscopy (Right, 1/31/2022).      Current Medications:     Current Outpatient Medications:     tiZANidine (ZANAFLEX) 4 MG tablet, Take 1 tablet by mouth nightly as needed (spasms), Disp: 10 tablet, Rfl: 0    verapamil (CALAN) 120 MG tablet, Take 120 mg by mouth daily, Disp: , Rfl:     predniSONE (DELTASONE) 10 MG tablet, Take 10 mg by mouth daily, Disp: , Rfl:     rizatriptan (MAXALT) 10 MG tablet, Take 10 mg by mouth once as needed for Migraine May repeat in 2 hours if needed, Disp: , Rfl:     famotidine (PEPCID) 20 MG tablet, Take 20 mg by mouth daily, Disp: , Rfl:     Acetaminophen (TYLENOL ARTHRITIS PAIN PO), Take 650 mg by mouth as needed, Disp: , Rfl:     albuterol sulfate  (90 Base) MCG/ACT inhaler, Inhale 2 puffs into the lungs 4 times daily as needed for Wheezing, Disp: 1 Inhaler, Rfl: 0    amitriptyline (ELAVIL) 25 MG tablet, TAKE 1 TABLET BY MOUTH EVERY NIGHT, Disp: 30 tablet, Rfl: 0    SUMAtriptan (IMITREX) 50 MG tablet, Take 1 tablet by mouth once as needed for Migraine Do not takes More than 2 pills a day, Disp: 9 tablet, Rfl: 3    pantoprazole (PROTONIX) 40 MG tablet, Take 1 tablet by mouth every morning (before breakfast), Disp: 90 tablet, Rfl: 1    losartan-hydrochlorothiazide (HYZAAR) 100-12.5 MG per tablet, Take 1 tablet by mouth daily, Disp: 30 tablet, Rfl: 0     Allergies:    Aspirin, Codeine, Motrin [ibuprofen], and Naprosyn [naproxen]    Family History:  family history includes Arthritis in her mother; Asthma in her mother; Heart Disease in her father; High Blood Pressure in her brother and mother. Social History:   Social History     Occupational History    Not on file   Tobacco Use    Smoking status: Never    Smokeless tobacco: Never   Vaping Use    Vaping Use: Never used   Substance and Sexual Activity    Alcohol use: No    Drug use: No    Sexual activity: Not on file     [employer]    OBJECTIVE:  Resp 16   Ht 5' 4\" (1.626 m)   Wt 230 lb (104.3 kg)   LMP 07/10/2022   SpO2 100%   BMI 39.48 kg/m²    Psych: awake, alert  Cardio:  well perfused extremities, no cyanosis  Resp:  normal respiratory effort  Musculoskeletal:    Affected lower extremity:    Vascular: Limb well perfused, compartments soft/compressible. Skin: No erythema/ulcers. Intact.    Neurovascular Status:  Grossly neurovascularly intact throughout  Motion:  Grossly able to fire major muscle groups with appropriate/expected AROM, except for below  Tenderness to Palpation: Posterior Achilles tendon  -WNL resting tone of affected side compared to contralateral  -[facility] test shows no loss of Achilles continuity  -able to plantarflex through Achilles tendon--painful  -Able to leida/invert the foot, painless, 5/5 strength      RADIOLOGY:   7/21/2022 FINDINGS:  Three weightbearing views (AP, Mortise, and Lateral) of the right ankle and three weightbearing views (AP, Oblique, Lateral) of the right foot were obtained in the office today and reviewed, revealing no acute fracture, dislocation, or radioopaque foreign body/tumor. The ankle mortise is maintained with no widening of the clear spaces. Overall alignment is satisfactory. IMPRESSION:  No acute fracture/dislocation. Electronically signed by Shamika Sanchez MD       ASSESSMENT AND PLAN:  Body mass index is 39.48 kg/m². She has a right foot/ankle injury, concerning for a possible partial Achilles tendon rupture/injury, sustained on 7/13/2022. Notably, she has the past medical history as above. She has a history of GERD, vitamin D deficiency. We had a discussion today about the likely diagnosis and its natural history, physical exam and imaging findings, as well as various treatment options in detail. Surgically, we discussed a possible Achilles tendon surgery, depending on her clinical course and imaging as ordered below. Orders/referrals were placed as below at today's visit. She may continue to use her cam boot, and was provided heel lifts for her boot. We also discussed that she may use her crutches at home, to help avoid pain provoking activity. We discussed the risks of rupture/reinjury/worsening of condition. In order to know exactly how to proceed with treatment, an MRI stat was ordered today to evaluate the Achilles tendon. This is medically necessary to evaluate the structures in this area, for both diagnosis and treatment.     -The patient was provided a note, keeping her out of work for a week    All questions were answered and the above plan was agreed upon. The patient will return to clinic after the MRI has been obtained without x-rays. At her next visit, we will review her MRI, and discuss treatment options.             At the patient's next visit, depending on how the patient is doing and/or new imaging/labs results, we may consider the following options:    []  Lace up ankle     []  CAM boot         []  removable wrist brace     []  PT:        []  Wean out immobilization         []  Adv activity      []  Footmind        []  Spenco       []  Custom Orthotic:               []  AZ brace                    []  Rocker Bottom      []  Night splint    []  Heel cups        []  Strap        []  Toe gizmos    []  Topl        []  NSAIDs         []  Rosario        []  Ref:         []  Stress Xray    []  CT        []  MRI  []  Inj:          []  Consider OR      []  Pick OR date    No follow-ups on file. No orders of the defined types were placed in this encounter. No orders of the defined types were placed in this encounter. Aram Patel MD  Orthopedic Surgery        Please excuse any typos/errors, as this note was created with the assistance of voice recognition software. While intending to generate a document that actually reflects the content of the visit, the document can still have some errors including those of syntax and sound-a-like substitutions which may escape proof reading. In such instances, actual meaning can be extrapolated by context.

## 2022-07-21 NOTE — LETTER
[address] [place] [address] and Sports Medicine  [address] [address]  Phone: [phone]  Fax: [phone]    Clay Howard MD    July 21, 2022     Ervin Martinez, [address] [address]    Patient: Marcie Hendrickson   MR Number: [id number]   YOB: 1975   Date of Visit: 7/21/2022       Dear Ervin Martinez: Thank you for referring Shannan Costa to me for evaluation/treatment. Below are the relevant portions of my assessment and plan of care. She has a right foot/ankle injury, concerning for a possible partial Achilles tendon rupture/injury, sustained on 7/13/2022. Notably, she has the past medical history as above. She has a history of GERD, vitamin D deficiency. We had a discussion today about the likely diagnosis and its natural history, physical exam and imaging findings, as well as various treatment options in detail. Surgically, we discussed a possible Achilles tendon surgery, depending on her clinical course and imaging as ordered below. Orders/referrals were placed as below at today's visit. She may continue to use her cam boot, and was provided heel lifts for her boot. We also discussed that she may use her crutches at home, to help avoid pain provoking activity. We discussed the risks of rupture/reinjury/worsening of condition. In order to know exactly how to proceed with treatment, an MRI stat was ordered today to evaluate the Achilles tendon. This is medically necessary to evaluate the structures in this area, for both diagnosis and treatment.     -The patient was provided a note, keeping her out of work for a week    All questions were answered and the above plan was agreed upon. The patient will return to clinic after the MRI has been obtained without x-rays. At her next visit, we will review her MRI, and discuss treatment options.            If you have questions, please do not hesitate to call me. I look forward to following [place] along with you.     Sincerely,      Aydin Beal MD

## 2022-07-25 ENCOUNTER — HOSPITAL ENCOUNTER (OUTPATIENT)
Dept: MRI IMAGING | Age: 47
Discharge: HOME | End: 2022-07-27

## 2022-07-25 DIAGNOSIS — S86.011A RUPTURE OF RIGHT ACHILLES TENDON, INITIAL ENCOUNTER: ICD-10-CM

## 2022-07-25 PROCEDURE — 73721 MRI JNT OF LWR EXTRE W/O DYE: CPT

## 2022-07-26 ENCOUNTER — OFFICE VISIT (OUTPATIENT)
Dept: ORTHOPEDIC SURGERY | Age: 47
End: 2022-07-26

## 2022-07-26 VITALS — RESPIRATION RATE: 16 BRPM | WEIGHT: 230 LBS | OXYGEN SATURATION: 100 % | BODY MASS INDEX: 39.27 KG/M2 | HEIGHT: 64 IN

## 2022-07-26 DIAGNOSIS — S93.401D SPRAIN OF RIGHT ANKLE, UNSPECIFIED LIGAMENT, SUBSEQUENT ENCOUNTER: Primary | ICD-10-CM

## 2022-07-26 PROCEDURE — 99214 OFFICE O/P EST MOD 30 MIN: CPT | Performed by: ORTHOPAEDIC SURGERY

## 2022-07-26 RX ORDER — LIDOCAINE 4 G/G
PATCH TOPICAL
Qty: 14 PATCH | Refills: 0 | Status: SHIPPED | OUTPATIENT
Start: 2022-07-26

## 2022-07-26 NOTE — PROGRESS NOTES
[address] [place] [address] AND SPORTS MEDICINE  [facility] Sujatha Gomez  [address] [facility] [address]  Dept: [phone]    Ambulatory Orthopedic Consult      CHIEF COMPLAINT:    Chief Complaint   Patient presents with    Ankle Pain     Right       HISTORY OF PRESENT ILLNESS:      The patient is a 52 y.o. female who is being seen for evaluation of the above, which began around 7/13/22 relatively atraumatically. At today's visit, she is using a brace/boot. History is obtained today from:   [x]  the patient     [x]  EMR     []  one family member/friend    []  multiple family members/friends    []  other:      She reports feeling a pop in the back of her heel. INTERVAL HISTORY 7/26/2022:  She is seen again today in the office for follow up of imaging as below. Since being seen last, the patient is doing about the same overall. At today's visit, she is using a brace/boot. History is obtained today from:   [x]  the patient     [x]  EMR     []  one family member/friend    []  multiple family members/friends    []  other:        REVIEW OF SYSTEMS:  Musculoskeletal: See HPI for pertinent positives     Past Medical History:    She  has a past medical history of Dizzy spells, Hypertension, Low blood potassium, Mass, Migraine, Osteoarthritis, and Vitamin D deficiency. Past Surgical History:    She  has a past surgical history that includes cyst removal (Left); [place] tooth extraction; other surgical history (Right, 04/15/2014); and Knee arthroscopy (Right, 1/31/2022).      Current Medications:     Current Outpatient Medications:     tiZANidine (ZANAFLEX) 4 MG tablet, Take 1 tablet by mouth nightly as needed (spasms), Disp: 10 tablet, Rfl: 0    verapamil (CALAN) 120 MG tablet, Take 120 mg by mouth daily, Disp: , Rfl:     predniSONE (DELTASONE) 10 MG tablet, Take 10 mg by mouth daily, Disp: , Rfl:     rizatriptan (MAXALT) 10 MG tablet, Take 10 mg by mouth once as needed for Migraine May repeat in 2 hours if needed, Disp: , Rfl:     famotidine (PEPCID) 20 MG tablet, Take 20 mg by mouth daily, Disp: , Rfl:     Acetaminophen (TYLENOL ARTHRITIS PAIN PO), Take 650 mg by mouth as needed, Disp: , Rfl:     albuterol sulfate  (90 Base) MCG/ACT inhaler, Inhale 2 puffs into the lungs 4 times daily as needed for Wheezing, Disp: 1 Inhaler, Rfl: 0    amitriptyline (ELAVIL) 25 MG tablet, TAKE 1 TABLET BY MOUTH EVERY NIGHT, Disp: 30 tablet, Rfl: 0    SUMAtriptan (IMITREX) 50 MG tablet, Take 1 tablet by mouth once as needed for Migraine Do not takes More than 2 pills a day, Disp: 9 tablet, Rfl: 3    pantoprazole (PROTONIX) 40 MG tablet, Take 1 tablet by mouth every morning (before breakfast), Disp: 90 tablet, Rfl: 1    losartan-hydrochlorothiazide (HYZAAR) 100-12.5 MG per tablet, Take 1 tablet by mouth daily, Disp: 30 tablet, Rfl: 0     Allergies:    Aspirin, Codeine, Motrin [ibuprofen], and Naprosyn [naproxen]    Family History:  family history includes Arthritis in her mother; Asthma in her mother; Heart Disease in her father; High Blood Pressure in her brother and mother. Social History:   Social History     Occupational History    Not on file   Tobacco Use    Smoking status: Never    Smokeless tobacco: Never   Vaping Use    Vaping Use: Never used   Substance and Sexual Activity    Alcohol use: No    Drug use: No    Sexual activity: Not on file     [employer]    OBJECTIVE:  Resp 16   Ht 5' 4\" (1.626 m)   Wt 230 lb (104.3 kg)   LMP 07/10/2022   SpO2 100%   BMI 39.48 kg/m²    Psych: awake, alert  Cardio:  well perfused extremities, no cyanosis  Resp:  normal respiratory effort  Musculoskeletal:    Affected lower extremity:    Vascular: Limb well perfused, compartments soft/compressible. Skin: No erythema/ulcers. Intact.    Neurovascular Status:  Grossly neurovascularly intact throughout  Motion:  Grossly able to fire major muscle groups with appropriate/expected AROM, except for below  Tenderness to Palpation: Posterior Achilles tendon  -WNL resting tone of affected side compared to contralateral  -Waynette Cordia test shows no loss of Achilles continuity  -able to plantarflex through Achilles tendon--painful  -Able to leida/invert the foot, painless, 5/5 strength      RADIOLOGY:   7/26/2022 Prior images reviewed for reference. MRI images and radiology report reviewed, as below:    1. No Achilles tendon tear. Mild insertional tendinosis. 2. Sequela of prior ATFL sprain. No full-thickness tear. FINDINGS:  Three weightbearing views (AP, Mortise, and Lateral) of the right ankle and three weightbearing views (AP, Oblique, Lateral) of the right foot were obtained in the office today and reviewed, revealing no acute fracture, dislocation, or radioopaque foreign body/tumor. The ankle mortise is maintained with no widening of the clear spaces. Overall alignment is satisfactory. IMPRESSION:  No acute fracture/dislocation. Electronically signed by Mahad Eldridge MD       ASSESSMENT AND PLAN:  Body mass index is 39.48 kg/m². She has a right foot/ankle injury, initially concerning for a possible partial Achilles tendon rupture/injury, sustained on 7/13/2022. MRI from 7/21/2022 showed mild insertional Achilles tendinosis but no Achilles tendon tear, as well as sequelae of prior ATFL sprain. Notably, she has the past medical history as above. She has a history of GERD, vitamin D deficiency. We had a discussion today about the likely diagnosis and its natural history, physical exam and imaging findings, as well as various treatment options in detail. Surgically, we discussed that no surgical invention is indicated, and I recommended conservative management. Orders/referrals were placed as below at today's visit. The patient was provided a lace up brace, for stability and pain control, and to help decrease the risk of reinjury.   The patient may weight bear as tolerated, and was cautioned to avoid pain provoking activity. The patient was referred to physical therapy. She was ordered topical lidocaine patches, as she is unable to take NSAIDs/aspirin.     -The patient was provided a note allowing him to return to work full duty without restrictions    All questions were answered and the above plan was agreed upon. The patient will return to clinic in 3 months as needed. At the patient's next visit, depending on how the patient is doing and/or new imaging/labs results, we may consider the following options:    []  Lace up ankle     []  CAM boot         []  removable wrist brace     []  PT:        []  Wean out immobilization         []  Adv activity      []  Footmind        []  Spenco       []  Custom Orthotic:               []  AZ brace                    []  Rocker Bottom      []  Night splint    []  Heel cups        []  Strap        []  Toe gizmos    []  Topl        []  NSAIDs         []  Rosario        []  Ref:         []  Stress Xray    []  CT        []  MRI  []  Inj:          []  Consider OR      []  Pick OR date    No follow-ups on file. No orders of the defined types were placed in this encounter. No orders of the defined types were placed in this encounter. Bibiana Ya MD  Orthopedic Surgery        Please excuse any typos/errors, as this note was created with the assistance of voice recognition software. While intending to generate a document that actually reflects the content of the visit, the document can still have some errors including those of syntax and sound-a-like substitutions which may escape proof reading. In such instances, actual meaning can be extrapolated by context.

## 2022-07-26 NOTE — TELEPHONE ENCOUNTER
Called patient, left a VM letting her know Dr. Michael Quiñones would order lidocaine patches instead of the voltaren gel. I told her to call the clinic if she had and further questions.

## 2022-07-26 NOTE — LETTER
[address] [place] [address] and Sports [address] [place] [address]  Phone: [phone]  Fax: [phone]    Shamika Sanchez MD        July 26, 2022     Patient: Yusuf Muller   YOB: 1975   Date of Visit: 7/26/2022       To Whom It May Concern: It is my medical opinion that Minerva Downs return to work on 7/27/22 full duty with no restrictions. If you have any questions or concerns, please don't hesitate to call.     Sincerely,    The office of Dr. Kaye Looney MD

## 2022-09-13 ENCOUNTER — HOSPITAL ENCOUNTER (EMERGENCY)
Age: 47
Discharge: HOME | End: 2022-09-14
Attending: EMERGENCY MEDICINE

## 2022-09-13 VITALS
WEIGHT: 200 LBS | BODY MASS INDEX: 34.15 KG/M2 | TEMPERATURE: 98.1 F | OXYGEN SATURATION: 99 % | RESPIRATION RATE: 18 BRPM | HEIGHT: 64 IN | SYSTOLIC BLOOD PRESSURE: 172 MMHG | DIASTOLIC BLOOD PRESSURE: 92 MMHG | HEART RATE: 76 BPM

## 2022-09-13 DIAGNOSIS — J06.9 UPPER RESPIRATORY TRACT INFECTION, UNSPECIFIED TYPE: Primary | ICD-10-CM

## 2022-09-13 PROCEDURE — 99283 EMERGENCY DEPT VISIT LOW MDM: CPT

## 2022-09-13 ASSESSMENT — PAIN - FUNCTIONAL ASSESSMENT: PAIN_FUNCTIONAL_ASSESSMENT: 0-10

## 2022-09-13 ASSESSMENT — PAIN SCALES - GENERAL: PAINLEVEL_OUTOF10: 5

## 2022-09-13 NOTE — Clinical Note
Temi Thomson was seen and treated in our emergency department on 9/13/2022. She may return to work on 09/17/2022. If you have any questions or concerns, please don't hesitate to call.       Ranjith Tang MD

## 2022-09-14 LAB
INFLUENZA A: NOT DETECTED
INFLUENZA B: NOT DETECTED
SARS-COV-2 RNA, RT PCR: NOT DETECTED
SOURCE: NORMAL
SPECIMEN DESCRIPTION: NORMAL

## 2022-09-14 PROCEDURE — 87636 SARSCOV2 & INF A&B AMP PRB: CPT

## 2022-09-14 RX ORDER — GUAIFENESIN/DEXTROMETHORPHAN 100-10MG/5
5 SYRUP ORAL 3 TIMES DAILY PRN
Qty: 120 ML | Refills: 0 | Status: SHIPPED | OUTPATIENT
Start: 2022-09-14 | End: 2022-09-24

## 2022-09-14 ASSESSMENT — ENCOUNTER SYMPTOMS
VOMITING: 0
COUGH: 1
SORE THROAT: 1
RHINORRHEA: 1
NAUSEA: 0

## 2022-09-14 NOTE — ED PROVIDER NOTES
[address]  EMERGENCY DEPARTMENT ENCOUNTER      Pt Name: Claudette Morris  MRN: [id number]  [place] 1975  Date of evaluation: 9/14/22      CHIEF COMPLAINT       Chief Complaint   Patient presents with    Headache    Cough    Facial Pain    Nasal Congestion     PT to ED with HA, facial pain, cough/congestion x4 days, not improved with nyquil  Pt appears in NAD  Denies sick contacts  Denies CP/SOB     HISTORY OF PRESENT ILLNESS   HPI 52 y.o. female presents with c/o runny nose, cough, congestion. Symptoms started 4 days ago. Denies any sick contacts. She has been taking OTC cough and cold medications with minimal relief. Reports a lot of frontal sinus pressure. Reports her symptoms are moderate in severity, constant in duration, persistent in course. REVIEW OF SYSTEMS     Review of Systems   Constitutional:  Negative for fever. HENT:  Positive for congestion, rhinorrhea and sore throat. Eyes:  Negative for visual disturbance. Respiratory:  Positive for cough. Cardiovascular:  Negative for chest pain. Gastrointestinal:  Negative for nausea and vomiting. Genitourinary:  Negative for dysuria. Musculoskeletal:  Positive for myalgias. Skin:  Negative for rash. Neurological:  Positive for headaches. Hematological:  Negative for adenopathy. PAST MEDICAL HISTORY     Past Medical History:   Diagnosis Date    Dizzy spells     Hypertension     Low blood potassium     Mass     rt.  thigh    Migraine     Osteoarthritis     hip    Vitamin D deficiency        SURGICAL HISTORY       Past Surgical History:   Procedure Laterality Date    CYST REMOVAL Left     ring finger    KNEE ARTHROSCOPY Right 1/31/2022    KNEE ARTHROSCOPY PARTIAL MEDIAL MENISCECTOMY performed by Amando Granger MD at [address] [place] [address] Right 04/15/2014    removal of lesions X4    WISDOM TOOTH EXTRACTION         CURRENT MEDICATIONS       Discharge Medication List as of 9/14/2022 12:17 AM        CONTINUE these medications which have NOT CHANGED    Details   lidocaine 4 % external patch Apply to affected area; leave in place no longer than 12 hrs then remove the patch; use no longer than 12 hrs/day total, Disp-14 patch, R-0, Normal      tiZANidine (ZANAFLEX) 4 MG tablet Take 1 tablet by mouth nightly as needed (spasms), Disp-10 tablet, R-0Print      verapamil (CALAN) 120 MG tablet Take 120 mg by mouth dailyHistorical Med      predniSONE (DELTASONE) 10 MG tablet Take 10 mg by mouth dailyHistorical Med      rizatriptan (MAXALT) 10 MG tablet Take 10 mg by mouth once as needed for Migraine May repeat in 2 hours if neededHistorical Med      famotidine (PEPCID) 20 MG tablet Take 20 mg by mouth dailyHistorical Med      Acetaminophen (TYLENOL ARTHRITIS PAIN PO) Take 650 mg by mouth as neededHistorical Med      albuterol sulfate  (90 Base) MCG/ACT inhaler Inhale 2 puffs into the lungs 4 times daily as needed for Wheezing, Disp-1 Inhaler, R-0Print      amitriptyline (ELAVIL) 25 MG tablet TAKE 1 TABLET BY MOUTH EVERY NIGHT, Disp-30 tablet, R-0Normal      SUMAtriptan (IMITREX) 50 MG tablet Take 1 tablet by mouth once as needed for Migraine Do not takes More than 2 pills a day, Disp-9 tablet, R-3Normal      pantoprazole (PROTONIX) 40 MG tablet Take 1 tablet by mouth every morning (before breakfast), Disp-90 tablet, R-1Normal      losartan-hydrochlorothiazide (HYZAAR) 100-12.5 MG per tablet Take 1 tablet by mouth daily, Disp-30 tablet, R-0Print             ALLERGIES     is allergic to aspirin, codeine, motrin [ibuprofen], and naprosyn [naproxen]. FAMILY HISTORY     She indicated that her mother is alive. She indicated that her father is . She indicated that the status of her brother is unknown. SOCIAL HISTORY      reports that she has never smoked. She has never used smokeless tobacco. She reports that she does not drink alcohol and does not use drugs.     PHYSICAL EXAM     INITIAL VITALS: BP (!) 172/92 Pulse 76   Temp 98.1 °F (36.7 °C) (Oral)   Resp 18   Ht 5' 4\" (1.626 m)   Wt 200 lb (90.7 kg)   SpO2 99%   BMI 34.33 kg/m²   Gen: NAD  Head: NC/at  Eyes: PERRL, anicteric, no conjunctivitis. ENT: TM clear bilaterally. Pharynx is non-erythematous. No tonsillar hypertrophy or exudates, uvula is midline. No evidence of a pharyngeal abscess. There is clear rhinorrhea. Neck: No adenopathy. No JVD. No stridor  CVS: RRR  PULM: CTA  ABD: Soft, no TTP  MSK: Normal muscle bulk. No CVA TTTP  SKIN: No rash. Neuro: A&Ox3, appropriate movement of all extremities, ambulatory with a normal gait, fluent speech. Extremities: No lower extremity edema. Appropriate capillary refill. MEDICAL DECISION MAKING:     MDM  The patient's signs and symptoms are consistent with an acute mild viral illness. At this time there is significant evidence of Covid-19 community spread due to this pandemic, and I feel the patient most likely has mild Covid-19 or other viral illness. The patient's covid test is negative. Influenzsa negative. Sinusitis likely viral.     The patient is nontoxic and well appearing, no evidence of hypoxia or impending respiratory failure. The patient is tolerating PO. I do not feel the patient has evidence of significant dehydration or end organ failure at this time. I do not feel the patient has an emergent medical condition at this time. The patient is referred to appropriate outpatient follow up through their PCP or [facility]. I discussed with the patient home isolation measures, anticipatory guidance, discharge instructions, and to return immediately for worsening of symptoms. The patient is agreeable with this plan.         #331 & 332:  Antibiotic use with Sinusitis  *if the second, third, or fourth prompt is selected, only then should the clinician see the sub-prompts addressing amoxicillin   [x] The patient has sinusitis and antibiotics are not indicated/not prescribed at this time. [SATISFIES MIPS PERFORMANCE]  DIAGNOSTIC RESULTS   \  EMERGENCY DEPARTMENT COURSE:   Vitals:    Vitals:    09/13/22 2302   BP: (!) 172/92   Pulse: 76   Resp: 18   Temp: 98.1 °F (36.7 °C)   TempSrc: Oral   SpO2: 99%   Weight: 200 lb (90.7 kg)   Height: 5' 4\" (1.626 m)       The patient was given the following medications while in the emergency department:  Orders Placed This Encounter   Medications    guaiFENesin-dextromethorphan (ROBITUSSIN DM) 100-10 MG/5ML syrup     Sig: Take 5 mLs by mouth 3 times daily as needed for Cough     Dispense:  120 mL     Refill:  0     -------------------------  CRITICAL CARE:   CONSULTS: None  PROCEDURES: Procedures     FINAL IMPRESSION      1.  Upper respiratory tract infection, unspecified type          DISPOSITION/PLAN   DISPOSITION Decision To Discharge 09/14/2022 12:17:17 AM      PATIENT REFERRED TO:  Ivett Hurst MD  [id number]  [phone]    In 1 week      [facility] ED  [facility] [address] [place] [address]  [phone]    If symptoms worsen    DISCHARGE MEDICATIONS:  Discharge Medication List as of 9/14/2022 12:17 AM        START taking these medications    Details   guaiFENesin-dextromethorphan (ROBITUSSIN DM) 100-10 MG/5ML syrup Take 5 mLs by mouth 3 times daily as needed for Cough, Disp-120 mL, R-0Normal               Ti Trammell MD  Attending Emergency Physician                      Ti Trammell MD  09/14/22 [phone]

## 2022-11-06 ENCOUNTER — HOSPITAL ENCOUNTER (EMERGENCY)
Age: 47
Discharge: HOME | End: 2022-11-06
Attending: STUDENT IN AN ORGANIZED HEALTH CARE EDUCATION/TRAINING PROGRAM

## 2022-11-06 ENCOUNTER — APPOINTMENT (OUTPATIENT)
Dept: GENERAL RADIOLOGY | Age: 47
End: 2022-11-06

## 2022-11-06 VITALS
DIASTOLIC BLOOD PRESSURE: 86 MMHG | SYSTOLIC BLOOD PRESSURE: 137 MMHG | RESPIRATION RATE: 16 BRPM | HEART RATE: 77 BPM | TEMPERATURE: 97.3 F | WEIGHT: 190 LBS | BODY MASS INDEX: 32.44 KG/M2 | OXYGEN SATURATION: 99 % | HEIGHT: 64 IN

## 2022-11-06 DIAGNOSIS — M25.512 CHRONIC LEFT SHOULDER PAIN: Primary | ICD-10-CM

## 2022-11-06 DIAGNOSIS — G89.29 CHRONIC LEFT SHOULDER PAIN: Primary | ICD-10-CM

## 2022-11-06 PROCEDURE — 73030 X-RAY EXAM OF SHOULDER: CPT

## 2022-11-06 PROCEDURE — 99283 EMERGENCY DEPT VISIT LOW MDM: CPT

## 2022-11-06 RX ORDER — METHOCARBAMOL 750 MG/1
750 TABLET, FILM COATED ORAL 4 TIMES DAILY
Qty: 40 TABLET | Refills: 0 | Status: SHIPPED | OUTPATIENT
Start: 2022-11-06 | End: 2022-11-16

## 2022-11-06 ASSESSMENT — ENCOUNTER SYMPTOMS
COUGH: 0
SHORTNESS OF BREATH: 0
ABDOMINAL PAIN: 0
NAUSEA: 0
RHINORRHEA: 0
VOMITING: 0

## 2022-11-06 ASSESSMENT — PAIN DESCRIPTION - DESCRIPTORS: DESCRIPTORS: BURNING

## 2022-11-06 ASSESSMENT — PAIN SCALES - GENERAL: PAINLEVEL_OUTOF10: 6

## 2022-11-06 ASSESSMENT — PAIN DESCRIPTION - ORIENTATION: ORIENTATION: LEFT

## 2022-11-06 ASSESSMENT — PAIN - FUNCTIONAL ASSESSMENT: PAIN_FUNCTIONAL_ASSESSMENT: 0-10

## 2022-11-06 NOTE — DISCHARGE INSTRUCTIONS
Please call your family doctor and get an appointment as soon as possible  I would recommend referral to physical therapy and/or referral to an orthopedist   You may take the Robaxin as prescribed throughout the day  You may also take Tylenol up to 1 g every 8 hours as needed for pain  Every hour, please try to perform the range of motion exercises that I showed you  You may also ice your shoulder for 20 to 30 minutes at a time up to 3 times a day

## 2022-11-06 NOTE — ED PROVIDER NOTES
[facility]  Emergency Department Encounter  Emergency Medicine Physician     Pt Name: Mary Lou Wren  MRN: [id number]  [place] 1975  Date of evaluation: 11/6/22  PCP:  Arturo Pimentel MD    CHIEF COMPLAINT       Chief Complaint   Patient presents with    Shoulder Pain       HISTORY OF PRESENT ILLNESS  (Location/Symptom, Timing/Onset, Context/Setting, Quality, Duration, Modifying Factors, Severity.)    Mary Lou Wren is a 52 y.o. female who presents with left-sided shoulder pain worsening over the past 4 months. Patient denies any acute injury. 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 States that she has been having worsening pain over the past day. States that she is been trying to take some Tylenol at home but has not had any significant relief. Denies any neck pain or back pain. Denies any numbness or tingling or paresthesias. Denies any decreased strength in the left upper extremity. Denies any injury. PAST MEDICAL / SURGICAL / SOCIAL / FAMILY HISTORY    has a past medical history of Dizzy spells, Hypertension, Low blood potassium, Mass, Migraine, Osteoarthritis, and Vitamin D deficiency. has a past surgical history that includes cyst removal (Left); [place] tooth extraction; other surgical history (Right, 04/15/2014); and Knee arthroscopy (Right, 1/31/2022).     Social History     Socioeconomic History    Marital status:      Spouse name: Not on file    Number of children: Not on file    Years of education: Not on file    Highest education level: Not on file   Occupational History    Not on file   Tobacco Use    Smoking status: Never    Smokeless tobacco: Never   Vaping Use    Vaping Use: Never used   Substance and Sexual Activity    Alcohol use: No    Drug use: No    Sexual activity: Not on file   Other Topics Concern    Not on file   Social History Narrative    Not on file     Social Determinants of Health     Financial Resource Strain: Not on file   Food Insecurity: Not on file   Transportation Needs: Not on file   Physical Activity: Unknown    Days of Exercise per Week: 7 days    Minutes of Exercise per Session: Patient refused   Stress: Not on file   Social Connections: Not on file   Intimate Partner Violence: Not At Risk    Fear of Current or Ex-Partner: No    Emotionally Abused: No    Physically Abused: No    Sexually Abused: No   Housing Stability: Not on file       Family History   Problem Relation Age of Onset    High Blood Pressure Mother     Arthritis Mother     Asthma Mother     Heart Disease Father     High Blood Pressure Brother        Allergies:    Aspirin, Codeine, Motrin [ibuprofen], and Naprosyn [naproxen]    Home Medications:  Prior to Admission medications    Medication Sig Start Date End Date Taking?  Authorizing Provider   methocarbamol (ROBAXIN-750) 750 MG tablet Take 1 tablet by mouth 4 times daily for 10 days 11/6/22 11/16/22 Yes Neftali Joshua,    lidocaine 4 % external patch Apply to affected area; leave in place no longer than 12 hrs then remove the patch; use no longer than 12 hrs/day total 7/26/22   Indiana Waller MD   verapamil (CALAN) 120 MG tablet Take 120 mg by mouth daily    Historical Provider, MD   predniSONE (DELTASONE) 10 MG tablet Take 10 mg by mouth daily    Historical Provider, MD   rizatriptan (MAXALT) 10 MG tablet Take 10 mg by mouth once as needed for Migraine May repeat in 2 hours if needed    Historical Provider, MD   famotidine (PEPCID) 20 MG tablet Take 20 mg by mouth daily    Historical Provider, MD   Acetaminophen (TYLENOL ARTHRITIS PAIN PO) Take 650 mg by mouth as needed    Historical Provider, MD   albuterol sulfate  (90 Base) MCG/ACT inhaler Inhale 2 puffs into the lungs 4 times daily as needed for Wheezing 7/2/19   Marisol Clinton MD   amitriptyline (ELAVIL) 25 MG tablet TAKE 1 TABLET BY MOUTH EVERY NIGHT 4/23/19   Yue Mosley MD   SUMAtriptan (IMITREX) 50 MG tablet Take 1 tablet by mouth once as needed for Migraine Do not takes More than 2 pills a day 3/28/19 3/28/19  Jamey Fothergill, MD   pantoprazole (PROTONIX) 40 MG tablet Take 1 tablet by mouth every morning (before breakfast) 3/28/19   Jamey Fothergill, MD   losartan-hydrochlorothiazide (HYZAAR) 100-12.5 MG per tablet Take 1 tablet by mouth daily 3/20/19   Annie Bradshaw MD       REVIEW OF SYSTEMS    (2-9 systems for level 4, 10 or more for level 5)    Review of Systems   Constitutional:  Negative for chills, fatigue and fever. HENT:  Negative for congestion and rhinorrhea. Respiratory:  Negative for cough and shortness of breath. Cardiovascular:  Negative for chest pain. Gastrointestinal:  Negative for abdominal pain, nausea and vomiting. Musculoskeletal:  Negative for myalgias. +left shoulder pain   Neurological:  Negative for headaches. All other systems reviewed and are negative. PHYSICAL EXAM   (up to 7 for level 4, 8 or more for level 5)    INITIAL VITALS:   ED Triage Vitals [11/06/22 1441]   BP Temp Temp src Heart Rate Resp SpO2 Height Weight   137/86 97.3 °F (36.3 °C) -- 77 16 99 % 5' 4\" (1.626 m) 190 lb (86.2 kg)       Physical Exam  Vitals and nursing note reviewed. Constitutional:       General: She is not in acute distress. Appearance: She is well-developed. Cardiovascular:      Rate and Rhythm: Normal rate and regular rhythm. Pulses: Normal pulses. Radial pulses are 2+ on the right side and 2+ on the left side. Pulmonary:      Effort: Pulmonary effort is normal. No respiratory distress. Breath sounds: Normal breath sounds. No decreased breath sounds. Abdominal:      General: There is no distension. Palpations: Abdomen is soft. Tenderness: There is no abdominal tenderness. Musculoskeletal:      Comments: Tenderness about the left shoulder.   Limited range of motion passive testing with flexion, extension, and AB duction   Skin:     General: Skin is warm and dry. Capillary Refill: Capillary refill takes less than 2 seconds. Neurological:      Mental Status: She is alert and oriented to person, place, and time. DIFFERENTIAL  DIAGNOSIS   PLAN (LABS / IMAGING / EKG):  Orders Placed This Encounter   Procedures    XR SHOULDER LEFT (MIN 2 VIEWS)       MEDICATIONS ORDERED:  Orders Placed This Encounter   Medications    methocarbamol (ROBAXIN-750) 750 MG tablet     Sig: Take 1 tablet by mouth 4 times daily for 10 days     Dispense:  40 tablet     Refill:  0         DIAGNOSTIC RESULTS / EMERGENCYDEPARTMENT COURSE / MDM   LABS:  Labs Reviewed - No data to display    RADIOLOGY:  XR SHOULDER LEFT (MIN 2 VIEWS)    Result Date: 11/6/2022  EXAM: XR Left Shoulder Complete, 2 or More Views EXAM DATE/TIME: 11/6/2022 2:57 pm CLINICAL HISTORY: ORDERING SYSTEM PROVIDED  chronic right shoulder pain with limited ROM TECHNOLOGIST PROVIDED HISTORY:  chronic right shoulder pain with limited ROM TECHNIQUE: Two or more views of the left shoulder. COMPARISON: No relevant prior studies available. FINDINGS: Bones/joints:  No acute findings. No acute fracture. No dislocation. No significant arthritic changes noted. Soft tissues:  No acute findings. No acute findings in the left shoulder. Impression:  Chronic pain of the left shoulder without any new injury. Limited range of motion testing of both active and passive testing. Will obtain x-ray. Took Tylenol much earlier this morning, will give another dose now. NSAID allergy. EMERGENCY DEPARTMENT COURSE:  X-ray negative. Discussed these findings with patient.   Given her limited range of motion with pain, I recommended that she needs to follow-up with her family doctor in order to get referral to physical therapy and/or see an orthopedist.  Will provide arm sling however explained to her that she needs to take her arm out of the sling every hour to perform range of motion exercises in order to decrease the chances of frozen shoulder. Patient voiced understanding of all instruction. Plan for discharge      CONSULTS:  None    CRITICAL CARE:  There was a high probability of clinically significant/life threatening deterioration in this patient's condition which required my urgent intervention. Total critical care time was 10 minutes. This excludes any time for separately reportable procedures. FINAL IMPRESSION     1. Chronic left shoulder pain          DISPOSITION / PLAN   DISPOSITION Decision To Discharge 11/06/2022 04:02:42 PM      Evaluation and treatment course in the ED, and plan of care upon discharge was discussed in length with the patient/patient representative. Patient/patient representative had no further questions prior to being discharged and was instructed to return to the ED for new or worsening symptoms. Any changes to existing medications or new prescriptions were reviewed with patient/patient representative and they expressed understanding of how to correctly take their medications and the possible side effects.     PATIENT REFERRED TO:  Kath Briseno MD  [facility] [address]  [phone]    Schedule an appointment as soon as possible for a visit in 2 days      DISCHARGE MEDICATIONS:  Discharge Medication List as of 11/6/2022  4:34 PM        START taking these medications    Details   methocarbamol (ROBAXIN-750) 750 MG tablet Take 1 tablet by mouth 4 times daily for 10 days, Disp-40 tablet, R-0Print             Lissa Smith DO  Emergency Medicine Attending    (Please note that portions of this note were completed with a voice recognition program.  Efforts were made to edit the dictations but occasionally words are mis-transcribed.)          Lissa Smith DO  11/06/22 2056

## 2022-11-06 NOTE — ED TRIAGE NOTES
Mode of arrival (squad #, walk in, police, etc) : walk in        Chief complaint(s): left shoulder pan         Arrival Note (brief scenario, treatment PTA, etc). : x 4 months no known injury        C= \"Have you ever felt that you should Cut down on your drinking? \"  No  A= \"Have people Annoyed you by criticizing your drinking? \"  No  G= \"Have you ever felt bad or Guilty about your drinking? \"  No  E= \"Have you ever had a drink as an Eye-opener first thing in the morning to steady your nerves or to help a hangover? \"  No      Deferred []      Reason for deferring: N/A    *If yes to two or more: probable alcohol abuse. *

## 2022-11-06 NOTE — Clinical Note
Isra Jay was seen and treated in our emergency department on 11/6/2022. She may return to work on 11/06/2022. Patient is safe to return to work but she can do no heavy lifting and no lifting above her head until she is cleared by either family doctor or her orthopedist     If you have any questions or concerns, please don't hesitate to call.       Taty Cassidy, DO

## 2022-11-06 NOTE — Clinical Note
Colleen Hdez was seen and treated in our emergency department on 11/6/2022. She may return to work on 11/07/2022. Patient is safe to return to work however she should not perform anything that requires any lifting over her head until she is cleared by her family doctor or an orthopedist     If you have any questions or concerns, please don't hesitate to call.       Mikel Goldberg, DO

## (undated) DEVICE — DRESSING,GAUZE,XEROFORM,CURAD,1"X8",ST: Brand: CURAD

## (undated) DEVICE — SUTURE ETHLN SZ 3-0 L18IN NONABSORBABLE BLK FS-1 L24MM 3/8 663H

## (undated) DEVICE — [TOMCAT CUTTER, ARTHROSCOPIC SHAVER BLADE,  DO NOT RESTERILIZE,  DO NOT USE IF PACKAGE IS DAMAGED,  KEEP DRY,  KEEP AWAY FROM SUNLIGHT]: Brand: FORMULA

## (undated) DEVICE — SOLUTION IV IRRIG LACTATED RINGERS 3000ML 2B7487

## (undated) DEVICE — COUNTER NDL 10 COUNT HLD 20 FOAM BLK SGL MAG

## (undated) DEVICE — PACK ARTHRO W PCH

## (undated) DEVICE — GOWN,AURORA,NONREINFORCED,LARGE: Brand: MEDLINE

## (undated) DEVICE — GLOVE ORTHO 8   MSG9480

## (undated) DEVICE — SINGLE PORT MANIFOLD: Brand: NEPTUNE 2

## (undated) DEVICE — PADDING CAST W6INXL4YD POLY POR SPUN DACRON SYN VERSATILE

## (undated) DEVICE — MERCY HEALTH ST CHARLES: Brand: MEDLINE INDUSTRIES, INC.

## (undated) DEVICE — ZIMMER® STERILE DISPOSABLE TOURNIQUET CUFF WITH PLC, DUAL PORT, SINGLE BLADDER, 30 IN. (76 CM)